# Patient Record
Sex: FEMALE | Race: OTHER | HISPANIC OR LATINO
[De-identification: names, ages, dates, MRNs, and addresses within clinical notes are randomized per-mention and may not be internally consistent; named-entity substitution may affect disease eponyms.]

---

## 2019-08-22 PROBLEM — Z00.00 ENCOUNTER FOR PREVENTIVE HEALTH EXAMINATION: Status: ACTIVE | Noted: 2019-08-22

## 2019-09-03 ENCOUNTER — APPOINTMENT (OUTPATIENT)
Dept: INTERNAL MEDICINE | Facility: CLINIC | Age: 59
End: 2019-09-03
Payer: MEDICARE

## 2019-09-03 VITALS
HEIGHT: 60 IN | OXYGEN SATURATION: 96 % | WEIGHT: 151 LBS | TEMPERATURE: 98.1 F | RESPIRATION RATE: 16 BRPM | DIASTOLIC BLOOD PRESSURE: 76 MMHG | HEART RATE: 103 BPM | SYSTOLIC BLOOD PRESSURE: 119 MMHG | BODY MASS INDEX: 29.64 KG/M2

## 2019-09-03 DIAGNOSIS — K29.40 CHRONIC ATROPHIC GASTRITIS W/OUT BLEEDING: ICD-10-CM

## 2019-09-03 DIAGNOSIS — M21.619 BUNION OF UNSPECIFIED FOOT: ICD-10-CM

## 2019-09-03 DIAGNOSIS — M19.90 UNSPECIFIED OSTEOARTHRITIS, UNSPECIFIED SITE: ICD-10-CM

## 2019-09-03 DIAGNOSIS — R10.13 EPIGASTRIC PAIN: ICD-10-CM

## 2019-09-03 DIAGNOSIS — F43.10 POST-TRAUMATIC STRESS DISORDER, UNSPECIFIED: ICD-10-CM

## 2019-09-03 DIAGNOSIS — F32.9 MAJOR DEPRESSIVE DISORDER, SINGLE EPISODE, UNSPECIFIED: ICD-10-CM

## 2019-09-03 DIAGNOSIS — F17.200 NICOTINE DEPENDENCE, UNSPECIFIED, UNCOMPLICATED: ICD-10-CM

## 2019-09-03 DIAGNOSIS — F10.20 ALCOHOL DEPENDENCE, UNCOMPLICATED: ICD-10-CM

## 2019-09-03 DIAGNOSIS — K59.09 OTHER CONSTIPATION: ICD-10-CM

## 2019-09-03 DIAGNOSIS — M79.601 PAIN IN RIGHT ARM: ICD-10-CM

## 2019-09-03 DIAGNOSIS — Z87.19 PERSONAL HISTORY OF OTHER DISEASES OF THE DIGESTIVE SYSTEM: ICD-10-CM

## 2019-09-03 DIAGNOSIS — Z72.89 OTHER PROBLEMS RELATED TO LIFESTYLE: ICD-10-CM

## 2019-09-03 PROCEDURE — G0439: CPT

## 2019-09-03 PROCEDURE — 99406 BEHAV CHNG SMOKING 3-10 MIN: CPT

## 2019-09-03 RX ORDER — CLONAZEPAM 0.5 MG/1
0.5 TABLET ORAL
Refills: 0 | Status: ACTIVE | COMMUNITY

## 2019-09-03 RX ORDER — NAPROXEN 500 MG/1
500 TABLET ORAL
Qty: 30 | Refills: 0 | Status: ACTIVE | COMMUNITY
Start: 2019-09-03 | End: 1900-01-01

## 2019-09-03 NOTE — PHYSICAL EXAM
[No Acute Distress] : no acute distress [Well Nourished] : well nourished [Well Developed] : well developed [Normal Sclera/Conjunctiva] : normal sclera/conjunctiva [Well-Appearing] : well-appearing [PERRL] : pupils equal round and reactive to light [EOMI] : extraocular movements intact [Normal Outer Ear/Nose] : the outer ears and nose were normal in appearance [Normal Oropharynx] : the oropharynx was normal [No JVD] : no jugular venous distention [No Lymphadenopathy] : no lymphadenopathy [Supple] : supple [Thyroid Normal, No Nodules] : the thyroid was normal and there were no nodules present [No Respiratory Distress] : no respiratory distress  [No Accessory Muscle Use] : no accessory muscle use [Clear to Auscultation] : lungs were clear to auscultation bilaterally [Normal Rate] : normal rate  [Regular Rhythm] : with a regular rhythm [Normal S1, S2] : normal S1 and S2 [No Murmur] : no murmur heard [No Carotid Bruits] : no carotid bruits [No Abdominal Bruit] : a ~M bruit was not heard ~T in the abdomen [No Varicosities] : no varicosities [Pedal Pulses Present] : the pedal pulses are present [No Edema] : there was no peripheral edema [No Palpable Aorta] : no palpable aorta [No Extremity Clubbing/Cyanosis] : no extremity clubbing/cyanosis [Soft] : abdomen soft [Non Tender] : non-tender [Non-distended] : non-distended [No Masses] : no abdominal mass palpated [No HSM] : no HSM [Normal Bowel Sounds] : normal bowel sounds [Normal Anterior Cervical Nodes] : no anterior cervical lymphadenopathy [Normal Posterior Cervical Nodes] : no posterior cervical lymphadenopathy [No Spinal Tenderness] : no spinal tenderness [No CVA Tenderness] : no CVA  tenderness [No Joint Swelling] : no joint swelling [No Rash] : no rash [Grossly Normal Strength/Tone] : grossly normal strength/tone [Coordination Grossly Intact] : coordination grossly intact [No Focal Deficits] : no focal deficits [Normal Gait] : normal gait [Deep Tendon Reflexes (DTR)] : deep tendon reflexes were 2+ and symmetric [Normal Insight/Judgement] : insight and judgment were intact [Normal Affect] : the affect was normal

## 2019-09-03 NOTE — COUNSELING
[Risk of tobacco use and health benefits of smoking cessation discussed] : Risk of tobacco use and health benefits of smoking cessation discussed [Cessation strategies including cessation program discussed] : Cessation strategies including cessation program discussed [Willing to Quit Smoking] : Willing to quit smoking [Use of nicotine replacement therapies and other medications discussed] : Use of nicotine replacement therapies and other medications discussed [Tobacco Use Cessation Intermediate Greater Than 3 Minutes Up to 10 Minutes] : Tobacco Use Cessation Intermediate Greater Than 3 Minutes Up to 10 Minutes

## 2019-09-03 NOTE — HISTORY OF PRESENT ILLNESS
[FreeTextEntry1] : pain right arm\par depression/ anxiety \par Interview and discussion conducted in Luxembourgish by Luxembourgish speaking Physician.\par  [de-identified] : 58 years old female here for first time for annual health examination; she complains of chronic anxiety depression; states pain right arm, right hand for two weeks , taking Advil with no relief

## 2019-09-03 NOTE — HEALTH RISK ASSESSMENT
[Good] : ~his/her~  mood as  good [] : Yes [11-15] : 11-15 [Monthly or less (1 pt)] : Monthly or less (1 point) [1 or 2 (0 pts)] : 1 or 2 (0 points) [Never (0 pts)] : Never (0 points) [No] : In the past 12 months have you used drugs other than those required for medical reasons? No [No falls in past year] : Patient reported no falls in the past year [1] : 1) Little interest or pleasure doing things for several days (1) [2] : 2) Feeling down, depressed, or hopeless for more than half of the days (2) [None] : None [High School] : high school [Single] : single [Feels Safe at Home] : Feels safe at home [Fully functional (bathing, dressing, toileting, transferring, walking, feeding)] : Fully functional (bathing, dressing, toileting, transferring, walking, feeding) [Fully functional (using the telephone, shopping, preparing meals, housekeeping, doing laundry, using] : Fully functional and needs no help or supervision to perform IADLs (using the telephone, shopping, preparing meals, housekeeping, doing laundry, using transportation, managing medications and managing finances) [Smoke Detector] : smoke detector [Carbon Monoxide Detector] : carbon monoxide detector [Safety elements used in home] : safety elements used in home [Seat Belt] :  uses seat belt [Sunscreen] : uses sunscreen [Reviewed updated] : Reviewed updated [Alone] : lives alone [On disability] : on disability [Change in mental status noted] : No change in mental status noted [Language] : denies difficulty with language [Behavior] : denies difficulty with behavior [Handling Complex Tasks] : denies difficulty handling complex tasks [Learning/Retaining New Information] : denies difficulty learning/retaining new information [Spatial Ability and Orientation] : denies difficulty with spatial ability and orientation [Reasoning] : denies difficulty with reasoning [Sexually Active] : not sexually active [High Risk Behavior] : no high risk behavior [Reports changes in hearing] : Reports no changes in hearing [Reports changes in vision] : Reports no changes in vision [Reports changes in dental health] : Reports no changes in dental health [Reports normal functional visual acuity (ie: able to read med bottle)] : Reports poor functional visual acuity.  [Caregiver Concerns] : does not have caregiver concerns [TB Exposure] : is not being exposed to tuberculosis [Guns at Home] : no guns at home [AdvancecareDate] : 09/03/2019

## 2019-09-11 ENCOUNTER — APPOINTMENT (OUTPATIENT)
Dept: INTERNAL MEDICINE | Facility: CLINIC | Age: 59
End: 2019-09-11

## 2021-10-01 VITALS
DIASTOLIC BLOOD PRESSURE: 69 MMHG | HEART RATE: 115 BPM | OXYGEN SATURATION: 95 % | TEMPERATURE: 98 F | SYSTOLIC BLOOD PRESSURE: 115 MMHG | RESPIRATION RATE: 18 BRPM

## 2021-10-01 LAB
ALBUMIN SERPL ELPH-MCNC: 3.4 G/DL — SIGNIFICANT CHANGE UP (ref 3.3–5)
ALP SERPL-CCNC: 92 U/L — SIGNIFICANT CHANGE UP (ref 40–120)
ALT FLD-CCNC: 65 U/L — HIGH (ref 4–33)
AMPHET UR-MCNC: NEGATIVE — SIGNIFICANT CHANGE UP
ANION GAP SERPL CALC-SCNC: 22 MMOL/L — HIGH (ref 7–14)
APAP SERPL-MCNC: <15 UG/ML — SIGNIFICANT CHANGE UP (ref 15–25)
AST SERPL-CCNC: 103 U/L — HIGH (ref 4–32)
BARBITURATES UR SCN-MCNC: NEGATIVE — SIGNIFICANT CHANGE UP
BASOPHILS # BLD AUTO: 0.09 K/UL — SIGNIFICANT CHANGE UP (ref 0–0.2)
BASOPHILS NFR BLD AUTO: 0.6 % — SIGNIFICANT CHANGE UP (ref 0–2)
BENZODIAZ UR-MCNC: NEGATIVE — SIGNIFICANT CHANGE UP
BILIRUB SERPL-MCNC: 0.8 MG/DL — SIGNIFICANT CHANGE UP (ref 0.2–1.2)
BUN SERPL-MCNC: 9 MG/DL — SIGNIFICANT CHANGE UP (ref 7–23)
CALCIUM SERPL-MCNC: 7.5 MG/DL — LOW (ref 8.4–10.5)
CHLORIDE SERPL-SCNC: 95 MMOL/L — LOW (ref 98–107)
CO2 SERPL-SCNC: 22 MMOL/L — SIGNIFICANT CHANGE UP (ref 22–31)
COCAINE METAB.OTHER UR-MCNC: NEGATIVE — SIGNIFICANT CHANGE UP
CREAT SERPL-MCNC: 0.51 MG/DL — SIGNIFICANT CHANGE UP (ref 0.5–1.3)
CREATININE URINE RESULT, DAU: 122 MG/DL — SIGNIFICANT CHANGE UP
EOSINOPHIL # BLD AUTO: 0.01 K/UL — SIGNIFICANT CHANGE UP (ref 0–0.5)
EOSINOPHIL NFR BLD AUTO: 0.1 % — SIGNIFICANT CHANGE UP (ref 0–6)
ETHANOL SERPL-MCNC: 290 MG/DL — HIGH
GLUCOSE SERPL-MCNC: 73 MG/DL — SIGNIFICANT CHANGE UP (ref 70–99)
HCT VFR BLD CALC: 44.2 % — SIGNIFICANT CHANGE UP (ref 34.5–45)
HGB BLD-MCNC: 16.2 G/DL — HIGH (ref 11.5–15.5)
IANC: 11.24 K/UL — HIGH (ref 1.5–8.5)
IMM GRANULOCYTES NFR BLD AUTO: 1.1 % — SIGNIFICANT CHANGE UP (ref 0–1.5)
LIDOCAIN IGE QN: 53 U/L — SIGNIFICANT CHANGE UP (ref 7–60)
LYMPHOCYTES # BLD AUTO: 1.3 K/UL — SIGNIFICANT CHANGE UP (ref 1–3.3)
LYMPHOCYTES # BLD AUTO: 9.2 % — LOW (ref 13–44)
MAGNESIUM SERPL-MCNC: 2.1 MG/DL — SIGNIFICANT CHANGE UP (ref 1.6–2.6)
MCHC RBC-ENTMCNC: 33.6 PG — SIGNIFICANT CHANGE UP (ref 27–34)
MCHC RBC-ENTMCNC: 36.7 GM/DL — HIGH (ref 32–36)
MCV RBC AUTO: 91.7 FL — SIGNIFICANT CHANGE UP (ref 80–100)
METHADONE UR-MCNC: NEGATIVE — SIGNIFICANT CHANGE UP
MONOCYTES # BLD AUTO: 1.28 K/UL — HIGH (ref 0–0.9)
MONOCYTES NFR BLD AUTO: 9.1 % — SIGNIFICANT CHANGE UP (ref 2–14)
NEUTROPHILS # BLD AUTO: 11.24 K/UL — HIGH (ref 1.8–7.4)
NEUTROPHILS NFR BLD AUTO: 79.9 % — HIGH (ref 43–77)
NRBC # BLD: 0 /100 WBCS — SIGNIFICANT CHANGE UP
NRBC # FLD: 0.03 K/UL — HIGH
OPIATES UR-MCNC: NEGATIVE — SIGNIFICANT CHANGE UP
OXYCODONE UR-MCNC: NEGATIVE — SIGNIFICANT CHANGE UP
PCP SPEC-MCNC: SIGNIFICANT CHANGE UP
PCP UR-MCNC: NEGATIVE — SIGNIFICANT CHANGE UP
PHOSPHATE SERPL-MCNC: 4.1 MG/DL — SIGNIFICANT CHANGE UP (ref 2.5–4.5)
PLATELET # BLD AUTO: 321 K/UL — SIGNIFICANT CHANGE UP (ref 150–400)
POTASSIUM SERPL-MCNC: 3.6 MMOL/L — SIGNIFICANT CHANGE UP (ref 3.5–5.3)
POTASSIUM SERPL-SCNC: 3.6 MMOL/L — SIGNIFICANT CHANGE UP (ref 3.5–5.3)
PROT SERPL-MCNC: 5.6 G/DL — LOW (ref 6–8.3)
RBC # BLD: 4.82 M/UL — SIGNIFICANT CHANGE UP (ref 3.8–5.2)
RBC # FLD: 20.8 % — HIGH (ref 10.3–14.5)
SALICYLATES SERPL-MCNC: <5 MG/DL — LOW (ref 15–30)
SODIUM SERPL-SCNC: 139 MMOL/L — SIGNIFICANT CHANGE UP (ref 135–145)
THC UR QL: NEGATIVE — SIGNIFICANT CHANGE UP
TOXICOLOGY SCREEN, DRUGS OF ABUSE, SERUM RESULT: SIGNIFICANT CHANGE UP
WBC # BLD: 14.07 K/UL — HIGH (ref 3.8–10.5)
WBC # FLD AUTO: 14.07 K/UL — HIGH (ref 3.8–10.5)

## 2021-10-01 PROCEDURE — 99285 EMERGENCY DEPT VISIT HI MDM: CPT

## 2021-10-01 RX ORDER — ONDANSETRON 8 MG/1
4 TABLET, FILM COATED ORAL ONCE
Refills: 0 | Status: COMPLETED | OUTPATIENT
Start: 2021-10-01 | End: 2021-10-01

## 2021-10-01 RX ORDER — SODIUM CHLORIDE 9 MG/ML
1000 INJECTION, SOLUTION INTRAVENOUS ONCE
Refills: 0 | Status: COMPLETED | OUTPATIENT
Start: 2021-10-01 | End: 2021-10-01

## 2021-10-01 RX ADMIN — SODIUM CHLORIDE 500 MILLILITER(S): 9 INJECTION, SOLUTION INTRAVENOUS at 21:12

## 2021-10-01 RX ADMIN — ONDANSETRON 4 MILLIGRAM(S): 8 TABLET, FILM COATED ORAL at 21:12

## 2021-10-01 NOTE — ED PROVIDER NOTE - OBJECTIVE STATEMENT
Ms Juliette MCRAE is a 60-year old with a history of EtOH abuse, depression, anxiety who presents with alcohol intake. She is accompanied by her sister, who provides history. Professional  utilized.     Ms MCRAE has been drinking for the past 45 years, notably increasing in the past 6 weeks. She states drinking several regular-sized bottles of vodka each day. Last drink 4pm today. She also endorses some nausea, nonbloody vomiting. She has had shaking and diaphoresis in the past, though does not endorse any today. Her sister also endorses concern that she typically takes all of her medication (advil, lorazepam, tylenol) at once with alcohol. She has a history of depression and anxiety. She denies any active suicidal ideation, homicidal ideation, or hallucinations. She does endorse some audiovisual hallucination in the past, with bright lights and people crowded around her and speaking in voices that she does not understand.     100PY smoker. Denies recreational drug use. Ms Juliette MCRAE is a 60-year old with a history of EtOH abuse, depression, anxiety who presents with alcohol intake. She is accompanied by her sister, who provides history. Professional  utilized.     Ms MCRAE has been drinking for the past 45 years, notably increasing in the past 6 weeks. She states drinking several regular-sized bottles of vodka each day. Last drink 4pm today. She also endorses some nausea, nonbloody vomiting. She has had shaking and diaphoresis in the past, though does not endorse any today. Her sister also endorses concern that she typically takes all of her medication (advil, tylenol, aspirin, clonazepam) at once with alcohol, with the intention to induce sleep. She has a history of depression and anxiety. 100PY smoker. Denies recreational drug use. She denies any active suicidal ideation, homicidal ideation, or hallucinations. She does endorse some audiovisual hallucination in the past, with bright lights and people crowded around her and speaking in voices that she does not understand.

## 2021-10-01 NOTE — ED PROVIDER NOTE - PHYSICAL EXAMINATION
Vital Signs Last 24 Hrs  T(C): 36.4 (01 Oct 2021 19:08), Max: 36.4 (01 Oct 2021 19:08)  T(F): 97.6 (01 Oct 2021 19:08), Max: 97.6 (01 Oct 2021 19:08)  HR: 115 (01 Oct 2021 19:08) (115 - 115)  BP: 115/69 (01 Oct 2021 19:08) (115/69 - 115/69)  BP(mean): --  RR: 18 (01 Oct 2021 19:08) (18 - 18)  SpO2: 95% (01 Oct 2021 19:08) (95% - 95%)    CONSTITUTIONAL: Well-groomed, resting comfortably on stretcher in no apparent distress  EYES: No conjunctival or scleral injection, non-icteric;   ENMT: No external nasal lesions; mucous membranes dry   NECK: Trachea midline without visible neck mass   RESPIRATORY: Breathing comfortably; lungs CTA without wheeze/rhonchi/rales  CARDIOVASCULAR: mildly tachycardic, regular rhythm, no M/G/R; pedal pulses full and symmetric; no lower extremity edema  GASTROINTESTINAL: No palpable masses or tenderness, +BS throughout, no rebound/guarding  SKIN: Dry, no diaphoresis, no rashes or ulcers noted  NEUROLOGIC: lethargic and sleepy in stretcher, but arousable by voice; no tremulousness, no seizures; no tongue fasciculations; no nystagmus; moves all extremities, strength and sensation grossly intact,   PSYCHIATRIC: A+Ox3; depressed mood and flat affect

## 2021-10-01 NOTE — ED ADULT TRIAGE NOTE - CHIEF COMPLAINT QUOTE
Pt brought in by her sister for ETOH abuse--pt drinks a quart of vodka per days and takes 10 pills at the same time--tylenol/aleve/asa/clonazpam  every day mixed together. Pt tried to slit her throat and sister prevented it. Pt intoxicated  --falling asleep in chair

## 2021-10-01 NOTE — ED ADULT NURSE NOTE - OBJECTIVE STATEMENT
Pt received to room 14 a/o x 3 primarily [place] speaking with  used. pt c/o ETOH use, nausea. per pt she has been drinking a "small bottle of vodka" per day for the past month. per pt sister pt also has been taking multiple pills with the alcohol everyday. pt denies taking any pills. Pt states she wants something so I can sleep. Respirations even and unlabored. Lung sounds clear with equal chest rise bilaterally. ABD is soft, non tender, non distended with normal active bowel sounds  Pt denies SI/HI.  Pt denies visual or auditory hallucinations or other complaints. 20g iv placed to right AC. labs drawn and sent. medication given as per order. pt sister took all belongings home.

## 2021-10-01 NOTE — ED PROVIDER NOTE - PROGRESS NOTE DETAILS
MD ENRIQUE PGY1: reassessed patient. mild tongue fasciculations. Ativan 2mg IV push given. Will reassess.

## 2021-10-01 NOTE — ED PROVIDER NOTE - NS ED ROS FT
REVIEW OF SYSTEMS:  CONSTITUTIONAL: +fatigue, No fever, chills, weight loss  EYES: No eye pain, visual disturbances, hallucinations   NECK: No pain or stiffness  RESPIRATORY: No cough, wheezing, chills or hemoptysis; No shortness of breath  CARDIOVASCULAR: No chest pain, palpitations, dizziness, or leg swelling  GASTROINTESTINAL: +nausea, +vomiting. No abdominal or epigastric pain. No hematemesis; No diarrhea or constipation.   GENITOURINARY: No dysuria, frequency, hematuria  NEUROLOGICAL: No headaches, memory loss, loss of strength, numbness, or tremors  SKIN: No rashes or diaphoresis

## 2021-10-01 NOTE — ED PROVIDER NOTE - CLINICAL SUMMARY MEDICAL DECISION MAKING FREE TEXT BOX
60F with PMHx EtOH abuse, depression, anxiety here for EtOH abuse. Currently no dysautonomia, tremors, or other signs/symptoms concerning for withdrawal. Plan for CBC, CMP, COVId for admission, EtOH level, drug/tox screen, 1L LR, Zofran for nauea, 1:1 and CIWA for safety.

## 2021-10-01 NOTE — ED PROVIDER NOTE - ATTENDING CONTRIBUTION TO CARE
Ras Larios DO:  patient seen and evaluated with the resident.  I was present for key portions of the History & Physical, and I agree with the Impression & Plan. 61 yo f pmh etoh abuse, pw etoh abuse. bib friend who provides collateral bedside. states pt has been using etoh daily for 6 weeks, increasing amounts, possible L vodka daily, last drink 1600. also reports taking prescription anti anxiety meds, possibly benzos. has been attempting to get friend into detox, accepted today however upon arriving center was not taking new pts today. brought here instead. reported active si gestures at home. pt denying si/hi in ed. mild n/v. denies cp, ha, falls. arrives hds, low grade tach. no tongue fasciculations.  no active hallucinations. labs, constants obs, likely tba.

## 2021-10-02 ENCOUNTER — INPATIENT (INPATIENT)
Facility: HOSPITAL | Age: 61
LOS: 9 days | Discharge: HOME | End: 2021-10-12
Attending: HOSPITALIST | Admitting: HOSPITALIST
Payer: MEDICARE

## 2021-10-02 DIAGNOSIS — F10.929 ALCOHOL USE, UNSPECIFIED WITH INTOXICATION, UNSPECIFIED: ICD-10-CM

## 2021-10-02 DIAGNOSIS — Z29.9 ENCOUNTER FOR PROPHYLACTIC MEASURES, UNSPECIFIED: ICD-10-CM

## 2021-10-02 DIAGNOSIS — F10.10 ALCOHOL ABUSE, UNCOMPLICATED: ICD-10-CM

## 2021-10-02 DIAGNOSIS — F41.9 ANXIETY DISORDER, UNSPECIFIED: ICD-10-CM

## 2021-10-02 DIAGNOSIS — F19.94 OTHER PSYCHOACTIVE SUBSTANCE USE, UNSPECIFIED WITH PSYCHOACTIVE SUBSTANCE-INDUCED MOOD DISORDER: ICD-10-CM

## 2021-10-02 DIAGNOSIS — R74.01 ELEVATION OF LEVELS OF LIVER TRANSAMINASE LEVELS: ICD-10-CM

## 2021-10-02 DIAGNOSIS — D72.829 ELEVATED WHITE BLOOD CELL COUNT, UNSPECIFIED: ICD-10-CM

## 2021-10-02 DIAGNOSIS — F29 UNSPECIFIED PSYCHOSIS NOT DUE TO A SUBSTANCE OR KNOWN PHYSIOLOGICAL CONDITION: ICD-10-CM

## 2021-10-02 LAB — SARS-COV-2 RNA SPEC QL NAA+PROBE: SIGNIFICANT CHANGE UP

## 2021-10-02 PROCEDURE — 99223 1ST HOSP IP/OBS HIGH 75: CPT

## 2021-10-02 PROCEDURE — 76700 US EXAM ABDOM COMPLETE: CPT | Mod: 26

## 2021-10-02 PROCEDURE — 12345: CPT | Mod: NC

## 2021-10-02 RX ORDER — NICOTINE POLACRILEX 2 MG
1 GUM BUCCAL DAILY
Refills: 0 | Status: DISCONTINUED | OUTPATIENT
Start: 2021-10-02 | End: 2021-10-12

## 2021-10-02 RX ORDER — ACETAMINOPHEN 500 MG
650 TABLET ORAL EVERY 6 HOURS
Refills: 0 | Status: DISCONTINUED | OUTPATIENT
Start: 2021-10-02 | End: 2021-10-12

## 2021-10-02 RX ORDER — SODIUM CHLORIDE 9 MG/ML
1000 INJECTION INTRAMUSCULAR; INTRAVENOUS; SUBCUTANEOUS
Refills: 0 | Status: DISCONTINUED | OUTPATIENT
Start: 2021-10-02 | End: 2021-10-03

## 2021-10-02 RX ORDER — INFLUENZA VIRUS VACCINE 15; 15; 15; 15 UG/.5ML; UG/.5ML; UG/.5ML; UG/.5ML
0.5 SUSPENSION INTRAMUSCULAR ONCE
Refills: 0 | Status: DISCONTINUED | OUTPATIENT
Start: 2021-10-02 | End: 2021-10-12

## 2021-10-02 RX ORDER — LANOLIN ALCOHOL/MO/W.PET/CERES
3 CREAM (GRAM) TOPICAL AT BEDTIME
Refills: 0 | Status: DISCONTINUED | OUTPATIENT
Start: 2021-10-02 | End: 2021-10-12

## 2021-10-02 RX ORDER — ONDANSETRON 8 MG/1
4 TABLET, FILM COATED ORAL EVERY 8 HOURS
Refills: 0 | Status: DISCONTINUED | OUTPATIENT
Start: 2021-10-02 | End: 2021-10-11

## 2021-10-02 RX ORDER — ENOXAPARIN SODIUM 100 MG/ML
40 INJECTION SUBCUTANEOUS DAILY
Refills: 0 | Status: DISCONTINUED | OUTPATIENT
Start: 2021-10-02 | End: 2021-10-12

## 2021-10-02 RX ADMIN — ENOXAPARIN SODIUM 40 MILLIGRAM(S): 100 INJECTION SUBCUTANEOUS at 13:25

## 2021-10-02 RX ADMIN — Medication 2 MILLIGRAM(S): at 21:22

## 2021-10-02 RX ADMIN — Medication 2 MILLIGRAM(S): at 01:32

## 2021-10-02 RX ADMIN — Medication 1 PATCH: at 20:07

## 2021-10-02 RX ADMIN — Medication 2 MILLIGRAM(S): at 17:59

## 2021-10-02 RX ADMIN — Medication 2 MILLIGRAM(S): at 14:07

## 2021-10-02 RX ADMIN — SODIUM CHLORIDE 75 MILLILITER(S): 9 INJECTION INTRAMUSCULAR; INTRAVENOUS; SUBCUTANEOUS at 04:37

## 2021-10-02 NOTE — BH CONSULTATION LIAISON ASSESSMENT NOTE - NSBHCHARTREVIEWLAB_PSY_A_CORE FT
CBC Full  -  ( 01 Oct 2021 21:14 )  WBC Count : 14.07 K/uL  RBC Count : 4.82 M/uL  Hemoglobin : 16.2 g/dL  Hematocrit : 44.2 %  Platelet Count - Automated : 321 K/uL  Mean Cell Volume : 91.7 fL  Mean Cell Hemoglobin : 33.6 pg  Mean Cell Hemoglobin Concentration : 36.7 gm/dL  Auto Neutrophil # : 11.24 K/uL  Auto Lymphocyte # : 1.30 K/uL  Auto Monocyte # : 1.28 K/uL  Auto Eosinophil # : 0.01 K/uL  Auto Basophil # : 0.09 K/uL  Auto Neutrophil % : 79.9 %  Auto Lymphocyte % : 9.2 %  Auto Monocyte % : 9.1 %  Auto Eosinophil % : 0.1 %  Auto Basophil % : 0.6 %  10-01    139  |  95<L>  |  9   ----------------------------<  73  3.6   |  22  |  0.51    Ca    7.5<L>      01 Oct 2021 21:14  Phos  4.1     10-01  Mg     2.10     10-01    TPro  5.6<L>  /  Alb  3.4  /  TBili  0.8  /  DBili  x   /  AST  103<H>  /  ALT  65<H>  /  AlkPhos  92  10-01  Alcohol, Blood: 290: <10 mg/dL = Negative mg/dL (10.01.21 @ 21:14)

## 2021-10-02 NOTE — H&P ADULT - NSHPPHYSICALEXAM_GEN_ALL_CORE
PHYSICAL EXAM:  GENERAL: NAD, well-developed, well-nourished  HEAD:  Atraumatic, Normocephalic  EYES: EOMI, PERRL, conjunctiva and sclera clear  NECK: Supple, No JVD  CHEST/LUNG: Clear to auscultation bilaterally; No wheezes, rales or rhonchi  HEART: tachycardic; No murmurs, rubs, or gallops, (+)S1, S2  ABDOMEN: Soft, Nontender, Nondistended; Normal Bowel sounds   EXTREMITIES:  2+ Peripheral Pulses, No clubbing, cyanosis, or edema  PSYCH: unable to assess due to patient cooperation   NEUROLOGY: unable to assess due to patient cooperation   SKIN: No rashes or lesions

## 2021-10-02 NOTE — H&P ADULT - NSHPLABSRESULTS_GEN_ALL_CORE
16.2   14.07 )-----------( 321      ( 01 Oct 2021 21:14 )             44.2       10-01    139  |  95<L>  |  9   ----------------------------<  73  3.6   |  22  |  0.51    Ca    7.5<L>      01 Oct 2021 21:14  Phos  4.1     10-01  Mg     2.10     10-01    TPro  5.6<L>  /  Alb  3.4  /  TBili  0.8  /  DBili  x   /  AST  103<H>  /  ALT  65<H>  /  AlkPhos  92  10-01      EKG interpreted by myself: sinus tachycardia

## 2021-10-02 NOTE — PROGRESS NOTE ADULT - PROBLEM SELECTOR PLAN 5
Chronic unstable  Called pharmacy to confirm: klonopin 0.5 mg TID prescribed by Dr. Kaur Pardo   Fills meds [facility] @ [phone]

## 2021-10-02 NOTE — H&P ADULT - PROBLEM SELECTOR PLAN 2
New  Patient endorsed "some audiovisual hallucination in the past, with bright lights and people crowded around her and speaking in voices that she does not understand."  1:1  Psych consult once not intoxicated/withdrawing

## 2021-10-02 NOTE — BH CONSULTATION LIAISON ASSESSMENT NOTE - RISK ASSESSMENT
Risk Factors: acute medical condition, etoh dependence/abuse, hx anxiety/depression, vh r/t alcohol, si r/t alcohol  Protective Factors: residential stability, supportive family, positive therapeutic relationship, denies si/sa, no access to firearms

## 2021-10-02 NOTE — BH CONSULTATION LIAISON ASSESSMENT NOTE - DIFFERENTIAL
etoh abuse   substance induced mood disorder  etoh dependence   substance induced mood disorder  etoh dependence/abuse

## 2021-10-02 NOTE — PROGRESS NOTE ADULT - PROBLEM SELECTOR PLAN 3
WBC 14.04 with history if vomiting, afebrile  Monitor off abx for now   If spikes temp would cover with Zosyn

## 2021-10-02 NOTE — PROGRESS NOTE ADULT - SUBJECTIVE AND OBJECTIVE BOX
[facility] Division of Hospital Medicine  Claudia Mariee DO  Pager (SANDEE-F, 8N-5P): [phone]    Patient is a 60y old  Female who presents with a chief complaint of intoxication (02 Oct 2021 02:57)    SUBJECTIVE / OVERNIGHT EVENTS: Pt awake and intermittently engaging in conversation this AM.  Reports tremors developing, has hx of ETOH withdrawal, but appears not in our system.   No fevers/chills, no N/V/D.  Asking for medicaiton to help her sleep.     MEDICATIONS  (STANDING):  enoxaparin Injectable 40 milliGRAM(s) SubCutaneous daily  influenza   Vaccine 0.5 milliLiter(s) IntraMuscular once  LORazepam   Injectable 2 milliGRAM(s) IV Push every 4 hours  LORazepam   Injectable   IV Push   sodium chloride 0.9%. 1000 milliLiter(s) (75 mL/Hr) IV Continuous <Continuous>    MEDICATIONS  (PRN):  acetaminophen   Tablet .. 650 milliGRAM(s) Oral every 6 hours PRN Temp greater or equal to 38C (100.4F), Mild Pain (1 - 3)  aluminum hydroxide/magnesium hydroxide/simethicone Suspension 30 milliLiter(s) Oral every 4 hours PRN Dyspepsia  LORazepam     Tablet 2 milliGRAM(s) Oral every 2 hours PRN Symptom-triggered 2 point increase in CIWA-Ar  LORazepam   Injectable 2 milliGRAM(s) IV Push every 1 hour PRN Symptom-triggered: each CIWA -Ar score 8 or GREATER  melatonin 3 milliGRAM(s) Oral at bedtime PRN Insomnia  ondansetron Injectable 4 milliGRAM(s) IV Push every 8 hours PRN Nausea and/or Vomiting      PHYSICAL EXAM:  Vital Signs Last 24 Hrs  T(C): 37.3 (02 Oct 2021 12:00), Max: 37.3 (02 Oct 2021 12:00)  T(F): 99.2 (02 Oct 2021 12:00), Max: 99.2 (02 Oct 2021 12:00)  HR: 99 (02 Oct 2021 12:00) (90 - 120)  BP: 134/57 (02 Oct 2021 11:48) (99/70 - 134/57)  BP(mean): --  RR: 18 (02 Oct 2021 06:05) (17 - 18)  SpO2: 92% (02 Oct 2021 06:05) (90% - 95%)    CONSTITUTIONAL: NAD, well-developed, well-groomed, tremulous  EYES: PERRLA; conjunctiva and sclera clear  RESPIRATORY: Normal respiratory effort; lungs are clear to auscultation bilaterally  CARDIOVASCULAR: Regular rate and rhythm, normal S1 and S2, no murmur/rub/gallop; No lower extremity edema  ABDOMEN: Nontender to palpation, normoactive bowel sounds, no rebound/guarding  MUSCLOSKELETAL:  No clubbing or cyanosis of digits; no joint swelling or tenderness to palpation  PSYCH: A+O to person, place, not to time; anxious, tremulous with arms extended  NEUROLOGY: CN 2-12 are intact and symmetric; no gross sensory deficits; no motor deficits   SKIN: No rashes; no palpable lesions    LABS:                        16.2   14.07 )-----------( 321      ( 01 Oct 2021 21:14 )             44.2     10-01    139  |  95<L>  |  9   ----------------------------<  73  3.6   |  22  |  0.51    Ca    7.5<L>      01 Oct 2021 21:14  Phos  4.1     10-01  Mg     2.10     10-01    TPro  5.6<L>  /  Alb  3.4  /  TBili  0.8  /  DBili  x   /  AST  103<H>  /  ALT  65<H>  /  AlkPhos  92  10-01

## 2021-10-02 NOTE — BH CONSULTATION LIAISON ASSESSMENT NOTE - CASE SUMMARY
Met with the patient via tele platform, impression and plan discussed with acp Keara Baldwin NP and agreed upon. Patient denies any si when asked, but there is significant concerns raised by sister about patient's ongoing depression and SI even when sober. Sister is very concerned about the patient's safety at this time, and is advocating for an inpatient psychiatric admission.   Continue 1:1co for now. Continue w/d treatment. Will see on Monday for f/u and risk assessment. CANNOT LEAVE AMA

## 2021-10-02 NOTE — BH CONSULTATION LIAISON ASSESSMENT NOTE - CURRENT MEDICATION
MEDICATIONS  (STANDING):  enoxaparin Injectable 40 milliGRAM(s) SubCutaneous daily  influenza   Vaccine 0.5 milliLiter(s) IntraMuscular once  LORazepam   Injectable 2 milliGRAM(s) IV Push every 4 hours  LORazepam   Injectable   IV Push   sodium chloride 0.9%. 1000 milliLiter(s) (75 mL/Hr) IV Continuous <Continuous>    MEDICATIONS  (PRN):  acetaminophen   Tablet .. 650 milliGRAM(s) Oral every 6 hours PRN Temp greater or equal to 38C (100.4F), Mild Pain (1 - 3)  aluminum hydroxide/magnesium hydroxide/simethicone Suspension 30 milliLiter(s) Oral every 4 hours PRN Dyspepsia  LORazepam     Tablet 2 milliGRAM(s) Oral every 2 hours PRN Symptom-triggered 2 point increase in CIWA-Ar  LORazepam   Injectable 2 milliGRAM(s) IV Push every 1 hour PRN Symptom-triggered: each CIWA -Ar score 8 or GREATER  melatonin 3 milliGRAM(s) Oral at bedtime PRN Insomnia  ondansetron Injectable 4 milliGRAM(s) IV Push every 8 hours PRN Nausea and/or Vomiting
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
## 2021-10-02 NOTE — H&P ADULT - HISTORY OF PRESENT ILLNESS
60 yr old female with a pmh of anxiety and alcohol and tobacco dependence . Patient would not awaken as s/p lorazepam for questioning  Therefore per chart review:  History of alcohol abuse with increase in amount of drinking the past 6 weeks. Patient drinks Vodka- of late copious amounts. Last drink 4pm 10/1/21. She had nausea and nonbloody vomiting today.   Patient endorsed "some audiovisual hallucination in the past, with bright lights and people crowded around her and speaking in voices that she does not understand."    Vitals in the ED T97.6, , /69, RR 18 95% RA
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
## 2021-10-02 NOTE — BH CONSULTATION LIAISON ASSESSMENT NOTE - NSBHCHARTREVIEWINVESTIGATE_PSY_A_CORE FT
Ventricular Rate 111 BPM    Atrial Rate 111 BPM    P-R Interval 122 ms    QRS Duration 76 ms    Q-T Interval 326 ms    QTC Calculation(Bazett) 443 ms    P Axis 72 degrees    R Axis 74 degrees    T Axis 58 degrees    Diagnosis Line Sinus tachycardia  Anterior infarct , age undetermined  Abnormal ECG

## 2021-10-02 NOTE — BH CONSULTATION LIAISON ASSESSMENT NOTE - NSBHCHARTREVIEWVS_PSY_A_CORE FT
Vital Signs Last 24 Hrs  T(C): 37.3 (02 Oct 2021 12:00), Max: 37.3 (02 Oct 2021 12:00)  T(F): 99.2 (02 Oct 2021 12:00), Max: 99.2 (02 Oct 2021 12:00)  HR: 89 (02 Oct 2021 13:57) (89 - 120)  BP: 138/67 (02 Oct 2021 13:57) (99/70 - 138/67)  BP(mean): --  RR: 18 (02 Oct 2021 13:57) (17 - 18)  SpO2: 96% (02 Oct 2021 13:57) (90% - 96%)

## 2021-10-02 NOTE — PROGRESS NOTE ADULT - PROBLEM SELECTOR PLAN 2
Patient endorsed "some audiovisual hallucination in the past, with bright lights and people crowded around her and speaking in voices that she does not understand."  Currently not endorsing hallucinations however not engaging much in conversation  Will consult psych for family's concern for suicidal ideations/attempts
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
## 2021-10-02 NOTE — PHARMACOTHERAPY INTERVENTION NOTE - COMMENTS
Medication list updated in Outpatient Medication Record (OMR) per [facility]. Please call [employer] [phone] if you have any questions.

## 2021-10-02 NOTE — BH CONSULTATION LIAISON ASSESSMENT NOTE - VIOLENCE RISK FACTORS:
Substance abuse/Impulsivity Substance abuse/Impulsivity/History of being victimized/traumatized/Lack of insight into violence risk/need for treatment

## 2021-10-03 DIAGNOSIS — J45.909 UNSPECIFIED ASTHMA, UNCOMPLICATED: ICD-10-CM

## 2021-10-03 LAB
B PERT DNA SPEC QL NAA+PROBE: SIGNIFICANT CHANGE UP
B PERT+PARAPERT DNA PNL SPEC NAA+PROBE: SIGNIFICANT CHANGE UP
BORDETELLA PARAPERTUSSIS (RAPRVP): SIGNIFICANT CHANGE UP
C PNEUM DNA SPEC QL NAA+PROBE: SIGNIFICANT CHANGE UP
COVID-19 SPIKE DOMAIN AB INTERP: POSITIVE
COVID-19 SPIKE DOMAIN ANTIBODY RESULT: >250 U/ML — HIGH
FLUAV SUBTYP SPEC NAA+PROBE: SIGNIFICANT CHANGE UP
FLUBV RNA SPEC QL NAA+PROBE: SIGNIFICANT CHANGE UP
HADV DNA SPEC QL NAA+PROBE: SIGNIFICANT CHANGE UP
HCOV 229E RNA SPEC QL NAA+PROBE: SIGNIFICANT CHANGE UP
HCOV HKU1 RNA SPEC QL NAA+PROBE: SIGNIFICANT CHANGE UP
HCOV NL63 RNA SPEC QL NAA+PROBE: SIGNIFICANT CHANGE UP
HCOV OC43 RNA SPEC QL NAA+PROBE: SIGNIFICANT CHANGE UP
HCV AB S/CO SERPL IA: 0.09 S/CO — SIGNIFICANT CHANGE UP (ref 0–0.99)
HCV AB SERPL-IMP: SIGNIFICANT CHANGE UP
HMPV RNA SPEC QL NAA+PROBE: SIGNIFICANT CHANGE UP
HPIV1 RNA SPEC QL NAA+PROBE: SIGNIFICANT CHANGE UP
HPIV2 RNA SPEC QL NAA+PROBE: SIGNIFICANT CHANGE UP
HPIV3 RNA SPEC QL NAA+PROBE: SIGNIFICANT CHANGE UP
HPIV4 RNA SPEC QL NAA+PROBE: SIGNIFICANT CHANGE UP
M PNEUMO DNA SPEC QL NAA+PROBE: SIGNIFICANT CHANGE UP
RAPID RVP RESULT: SIGNIFICANT CHANGE UP
RSV RNA SPEC QL NAA+PROBE: SIGNIFICANT CHANGE UP
RV+EV RNA SPEC QL NAA+PROBE: SIGNIFICANT CHANGE UP
SARS-COV-2 IGG+IGM SERPL QL IA: >250 U/ML — HIGH
SARS-COV-2 IGG+IGM SERPL QL IA: POSITIVE
SARS-COV-2 RNA SPEC QL NAA+PROBE: SIGNIFICANT CHANGE UP

## 2021-10-03 PROCEDURE — 99233 SBSQ HOSP IP/OBS HIGH 50: CPT

## 2021-10-03 RX ORDER — IPRATROPIUM/ALBUTEROL SULFATE 18-103MCG
3 AEROSOL WITH ADAPTER (GRAM) INHALATION EVERY 6 HOURS
Refills: 0 | Status: DISCONTINUED | OUTPATIENT
Start: 2021-10-03 | End: 2021-10-11

## 2021-10-03 RX ORDER — FUROSEMIDE 40 MG
20 TABLET ORAL ONCE
Refills: 0 | Status: COMPLETED | OUTPATIENT
Start: 2021-10-03 | End: 2021-10-03

## 2021-10-03 RX ADMIN — Medication 3 MILLILITER(S): at 09:31

## 2021-10-03 RX ADMIN — Medication 3 MILLILITER(S): at 16:20

## 2021-10-03 RX ADMIN — Medication 100 MILLIGRAM(S): at 14:45

## 2021-10-03 RX ADMIN — Medication 2 MILLIGRAM(S): at 02:18

## 2021-10-03 RX ADMIN — Medication 1 PATCH: at 19:37

## 2021-10-03 RX ADMIN — Medication 100 MILLIGRAM(S): at 09:38

## 2021-10-03 RX ADMIN — Medication 2 MILLIGRAM(S): at 09:02

## 2021-10-03 RX ADMIN — Medication 1.5 MILLIGRAM(S): at 18:19

## 2021-10-03 RX ADMIN — Medication 1.5 MILLIGRAM(S): at 14:46

## 2021-10-03 RX ADMIN — Medication 100 MILLIGRAM(S): at 22:00

## 2021-10-03 RX ADMIN — Medication 1 PATCH: at 14:05

## 2021-10-03 RX ADMIN — ENOXAPARIN SODIUM 40 MILLIGRAM(S): 100 INJECTION SUBCUTANEOUS at 14:05

## 2021-10-03 RX ADMIN — Medication 20 MILLIGRAM(S): at 09:02

## 2021-10-03 RX ADMIN — Medication 650 MILLIGRAM(S): at 21:59

## 2021-10-03 RX ADMIN — Medication 3 MILLILITER(S): at 22:41

## 2021-10-03 RX ADMIN — Medication 1.5 MILLIGRAM(S): at 22:00

## 2021-10-03 RX ADMIN — Medication 1 PATCH: at 20:07

## 2021-10-03 RX ADMIN — Medication 2 MILLIGRAM(S): at 05:51

## 2021-10-03 RX ADMIN — Medication 1 PATCH: at 07:13

## 2021-10-03 NOTE — PROGRESS NOTE ADULT - PROBLEM SELECTOR PLAN 1
ETOH level high on admission  Now with exam findings to suggest withdrawal, tremors, anxiety, diaphoresis  Start CIWA monitoring (so far scoring 1-2)  IV Ativan taper with symptom triggered Ativan  Spoke with sister Shirley: concern for drug misuse and suicide attempt (Tylenol + ASA + Klonopin + Melatonin) + ETOH, also expresses that she has been ingesting mouthwash.  There is no ISTOP record of Klonopin under the names Juliette Guevara, Juliette Whitaker, Juliette Escalera  Pt has attempted to ETOH rehab in past, but relapsed quickly

## 2021-10-03 NOTE — PROGRESS NOTE ADULT - PROBLEM SELECTOR PLAN 3
- Pt likely with underlying emphysema/COPD given longstanding tobacco use  - Start Duonebs q6hrs alondra for now  - Also trial Lasix 20mg IV x1 as pt was on standing fluids

## 2021-10-03 NOTE — PROGRESS NOTE ADULT - PROBLEM SELECTOR PLAN 4
WBC 14.04 with history if vomiting, afebrile, no new labs today   Monitor off abx for now   If spikes temp would cover with Zosyn

## 2021-10-03 NOTE — PROGRESS NOTE ADULT - PROBLEM SELECTOR PLAN 2
Patient endorsed "some audiovisual hallucination in the past, with bright lights and people crowded around her and speaking in voices that she does not understand."  Currently not endorsing hallucinations however not engaging much in conversation  Psych following for ongoing evaluation, ?possible inpt psych

## 2021-10-04 LAB
ALBUMIN SERPL ELPH-MCNC: 2.9 G/DL — LOW (ref 3.3–5)
ALP SERPL-CCNC: 85 U/L — SIGNIFICANT CHANGE UP (ref 40–120)
ALT FLD-CCNC: 40 U/L — HIGH (ref 4–33)
ANION GAP SERPL CALC-SCNC: 8 MMOL/L — SIGNIFICANT CHANGE UP (ref 7–14)
AST SERPL-CCNC: 45 U/L — HIGH (ref 4–32)
BILIRUB SERPL-MCNC: 0.9 MG/DL — SIGNIFICANT CHANGE UP (ref 0.2–1.2)
BUN SERPL-MCNC: 3 MG/DL — LOW (ref 7–23)
CALCIUM SERPL-MCNC: 8.5 MG/DL — SIGNIFICANT CHANGE UP (ref 8.4–10.5)
CHLORIDE SERPL-SCNC: 95 MMOL/L — LOW (ref 98–107)
CO2 SERPL-SCNC: 32 MMOL/L — HIGH (ref 22–31)
CREAT SERPL-MCNC: 0.46 MG/DL — LOW (ref 0.5–1.3)
GLUCOSE SERPL-MCNC: 92 MG/DL — SIGNIFICANT CHANGE UP (ref 70–99)
HCT VFR BLD CALC: 40.6 % — SIGNIFICANT CHANGE UP (ref 34.5–45)
HGB BLD-MCNC: 14.2 G/DL — SIGNIFICANT CHANGE UP (ref 11.5–15.5)
MAGNESIUM SERPL-MCNC: 1.9 MG/DL — SIGNIFICANT CHANGE UP (ref 1.6–2.6)
MCHC RBC-ENTMCNC: 33.3 PG — SIGNIFICANT CHANGE UP (ref 27–34)
MCHC RBC-ENTMCNC: 35 GM/DL — SIGNIFICANT CHANGE UP (ref 32–36)
MCV RBC AUTO: 95.3 FL — SIGNIFICANT CHANGE UP (ref 80–100)
NRBC # BLD: 0 /100 WBCS — SIGNIFICANT CHANGE UP
NRBC # FLD: 0 K/UL — SIGNIFICANT CHANGE UP
PLATELET # BLD AUTO: 216 K/UL — SIGNIFICANT CHANGE UP (ref 150–400)
POTASSIUM SERPL-MCNC: 4.5 MMOL/L — SIGNIFICANT CHANGE UP (ref 3.5–5.3)
POTASSIUM SERPL-SCNC: 4.5 MMOL/L — SIGNIFICANT CHANGE UP (ref 3.5–5.3)
PROT SERPL-MCNC: 5.5 G/DL — LOW (ref 6–8.3)
RBC # BLD: 4.26 M/UL — SIGNIFICANT CHANGE UP (ref 3.8–5.2)
RBC # FLD: 19.3 % — HIGH (ref 10.3–14.5)
SODIUM SERPL-SCNC: 135 MMOL/L — SIGNIFICANT CHANGE UP (ref 135–145)
WBC # BLD: 12.45 K/UL — HIGH (ref 3.8–10.5)
WBC # FLD AUTO: 12.45 K/UL — HIGH (ref 3.8–10.5)

## 2021-10-04 PROCEDURE — 99233 SBSQ HOSP IP/OBS HIGH 50: CPT

## 2021-10-04 PROCEDURE — 99232 SBSQ HOSP IP/OBS MODERATE 35: CPT

## 2021-10-04 RX ORDER — FOLIC ACID 0.8 MG
1 TABLET ORAL DAILY
Refills: 0 | Status: DISCONTINUED | OUTPATIENT
Start: 2021-10-04 | End: 2021-10-12

## 2021-10-04 RX ORDER — THIAMINE MONONITRATE (VIT B1) 100 MG
500 TABLET ORAL THREE TIMES A DAY
Refills: 0 | Status: DISCONTINUED | OUTPATIENT
Start: 2021-10-04 | End: 2021-10-08

## 2021-10-04 RX ADMIN — Medication 100 MILLIGRAM(S): at 14:42

## 2021-10-04 RX ADMIN — Medication 1.5 MILLIGRAM(S): at 11:07

## 2021-10-04 RX ADMIN — Medication 100 MILLIGRAM(S): at 06:11

## 2021-10-04 RX ADMIN — Medication 1.5 MILLIGRAM(S): at 06:11

## 2021-10-04 RX ADMIN — Medication 3 MILLILITER(S): at 09:46

## 2021-10-04 RX ADMIN — ENOXAPARIN SODIUM 40 MILLIGRAM(S): 100 INJECTION SUBCUTANEOUS at 11:11

## 2021-10-04 RX ADMIN — Medication 1 PATCH: at 11:40

## 2021-10-04 RX ADMIN — Medication 100 MILLIGRAM(S): at 21:41

## 2021-10-04 RX ADMIN — Medication 1.5 MILLIGRAM(S): at 01:39

## 2021-10-04 RX ADMIN — Medication 1 MILLIGRAM(S): at 21:41

## 2021-10-04 RX ADMIN — Medication 1 MILLIGRAM(S): at 14:42

## 2021-10-04 RX ADMIN — Medication 3 MILLILITER(S): at 22:12

## 2021-10-04 RX ADMIN — Medication 1 PATCH: at 20:15

## 2021-10-04 RX ADMIN — Medication 1 PATCH: at 00:40

## 2021-10-04 RX ADMIN — Medication 105 MILLIGRAM(S): at 21:49

## 2021-10-04 RX ADMIN — Medication 1 PATCH: at 06:53

## 2021-10-04 RX ADMIN — Medication 1 MILLIGRAM(S): at 18:40

## 2021-10-04 RX ADMIN — Medication 1 PATCH: at 11:11

## 2021-10-04 NOTE — PROGRESS NOTE ADULT - PROBLEM SELECTOR PLAN 4
WBC 14.04 with history if vomiting, afebrile, no new labs today   Monitor off abx for now   If spikes temp would cover with Zosyn WBC 14.04 with history if vomiting, afebrile, wbc trending down   Monitor off abx for now   If spikes temp would cover with Zosyn

## 2021-10-04 NOTE — PROGRESS NOTE ADULT - SUBJECTIVE AND OBJECTIVE BOX
Patient is a 60y old  Female who presents with a chief complaint of intoxication (03 Oct 2021 10:02)      SUBJECTIVE / OVERNIGHT EVENTS:  CIWA 1-2   MEDICATIONS  (STANDING):  albuterol/ipratropium for Nebulization 3 milliLiter(s) Nebulizer every 6 hours  benzonatate 100 milliGRAM(s) Oral every 8 hours  enoxaparin Injectable 40 milliGRAM(s) SubCutaneous daily  influenza   Vaccine 0.5 milliLiter(s) IntraMuscular once  LORazepam   Injectable 1.5 milliGRAM(s) IV Push every 4 hours  LORazepam   Injectable 1 milliGRAM(s) IV Push every 4 hours  LORazepam   Injectable   IV Push   nicotine -  14 mG/24Hr(s) Patch 1 patch Transdermal daily    MEDICATIONS  (PRN):  acetaminophen   Tablet .. 650 milliGRAM(s) Oral every 6 hours PRN Temp greater or equal to 38C (100.4F), Mild Pain (1 - 3)  aluminum hydroxide/magnesium hydroxide/simethicone Suspension 30 milliLiter(s) Oral every 4 hours PRN Dyspepsia  LORazepam     Tablet 2 milliGRAM(s) Oral every 2 hours PRN Symptom-triggered 2 point increase in CIWA-Ar  LORazepam   Injectable 2 milliGRAM(s) IV Push every 1 hour PRN Symptom-triggered: each CIWA -Ar score 8 or GREATER  melatonin 3 milliGRAM(s) Oral at bedtime PRN Insomnia  ondansetron Injectable 4 milliGRAM(s) IV Push every 8 hours PRN Nausea and/or Vomiting      Vital Signs Last 24 Hrs  T(C): 37.4 (04 Oct 2021 06:03), Max: 37.7 (03 Oct 2021 11:40)  T(F): 99.4 (04 Oct 2021 06:03), Max: 99.9 (03 Oct 2021 11:40)  HR: 86 (04 Oct 2021 10:04) (86 - 98)  BP: 131/81 (04 Oct 2021 06:03) (130/80 - 142/82)  BP(mean): --  RR: 18 (04 Oct 2021 06:03) (18 - 18)  SpO2: 96% (04 Oct 2021 10:04) (96% - 98%)  CAPILLARY BLOOD GLUCOSE        I&O's Summary    03 Oct 2021 07:01  -  04 Oct 2021 07:00  --------------------------------------------------------  IN: 0 mL / OUT: 3000 mL / NET: -3000 mL        PHYSICAL EXAM:  GENERAL: NAD, well-developed  HEAD:  Atraumatic, Normocephalic  EYES: EOMI, PERRLA, conjunctiva and sclera clear  NECK: Supple, No JVD  CHEST/LUNG: Clear to auscultation bilaterally; No wheeze  HEART: Regular rate and rhythm; No murmurs, rubs, or gallops  ABDOMEN: Soft, Nontender, Nondistended; Bowel sounds present  EXTREMITIES:  2+ Peripheral Pulses, No clubbing, cyanosis, or edema  PSYCH: AAOx3  NEUROLOGY: non-focal  SKIN: No rashes or lesions    LABS:                        14.2   12.45 )-----------( 216      ( 04 Oct 2021 07:12 )             40.6     10-04    135  |  95<L>  |  3<L>  ----------------------------<  92  4.5   |  32<H>  |  0.46<L>    Ca    8.5      04 Oct 2021 07:12  Mg     1.90     10-04    TPro  5.5<L>  /  Alb  2.9<L>  /  TBili  0.9  /  DBili  x   /  AST  45<H>  /  ALT  40<H>  /  AlkPhos  85  10-04              RADIOLOGY & ADDITIONAL TESTS:    Imaging Personally Reviewed:    Consultant(s) Notes Reviewed:      Care Discussed with Consultants/Other Providers:   Patient is a 60y old  Female who presents with a chief complaint of intoxication (03 Oct 2021 10:02)      SUBJECTIVE / OVERNIGHT EVENTS: patient seen and examined by bedside, denies headache, dizziness, SOB, CP, Palpitations , N/V/D, abdominal pain  CIWA 1-2   MEDICATIONS  (STANDING):  albuterol/ipratropium for Nebulization 3 milliLiter(s) Nebulizer every 6 hours  benzonatate 100 milliGRAM(s) Oral every 8 hours  enoxaparin Injectable 40 milliGRAM(s) SubCutaneous daily  influenza   Vaccine 0.5 milliLiter(s) IntraMuscular once  LORazepam   Injectable 1.5 milliGRAM(s) IV Push every 4 hours  LORazepam   Injectable 1 milliGRAM(s) IV Push every 4 hours  LORazepam   Injectable   IV Push   nicotine -  14 mG/24Hr(s) Patch 1 patch Transdermal daily    MEDICATIONS  (PRN):  acetaminophen   Tablet .. 650 milliGRAM(s) Oral every 6 hours PRN Temp greater or equal to 38C (100.4F), Mild Pain (1 - 3)  aluminum hydroxide/magnesium hydroxide/simethicone Suspension 30 milliLiter(s) Oral every 4 hours PRN Dyspepsia  LORazepam     Tablet 2 milliGRAM(s) Oral every 2 hours PRN Symptom-triggered 2 point increase in CIWA-Ar  LORazepam   Injectable 2 milliGRAM(s) IV Push every 1 hour PRN Symptom-triggered: each CIWA -Ar score 8 or GREATER  melatonin 3 milliGRAM(s) Oral at bedtime PRN Insomnia  ondansetron Injectable 4 milliGRAM(s) IV Push every 8 hours PRN Nausea and/or Vomiting      Vital Signs Last 24 Hrs  T(C): 37.4 (04 Oct 2021 06:03), Max: 37.7 (03 Oct 2021 11:40)  T(F): 99.4 (04 Oct 2021 06:03), Max: 99.9 (03 Oct 2021 11:40)  HR: 86 (04 Oct 2021 10:04) (86 - 98)  BP: 131/81 (04 Oct 2021 06:03) (130/80 - 142/82)  BP(mean): --  RR: 18 (04 Oct 2021 06:03) (18 - 18)  SpO2: 96% (04 Oct 2021 10:04) (96% - 98%)  CAPILLARY BLOOD GLUCOSE        I&O's Summary    03 Oct 2021 07:01  -  04 Oct 2021 07:00  --------------------------------------------------------  IN: 0 mL / OUT: 3000 mL / NET: -3000 mL        PHYSICAL EXAM:  CONSTITUTIONAL: NAD, well-developed, well-groomed  EYES: PERRLA; conjunctiva and sclera clear  RESPIRATORY: Diffuse wheezing in all lung fields   CARDIOVASCULAR: Regular rate and rhythm, normal S1 and S2, no murmur/rub/gallop; No lower extremity edema  ABDOMEN: Nontender to palpation, normoactive bowel sounds, no rebound/guarding  MUSCLOSKELETAL:  No clubbing or cyanosis of digits; no joint swelling or tenderness to palpation  PSYCH: A+O to person, place, and time; affect appropriate  NEUROLOGY: CN 2-12 are intact and symmetric; no gross sensory deficits; +tremors with arms extended,   SKIN: No rashes; no palpable lesions    LABS:                        14.2   12.45 )-----------( 216      ( 04 Oct 2021 07:12 )             40.6     10-04    135  |  95<L>  |  3<L>  ----------------------------<  92  4.5   |  32<H>  |  0.46<L>    Ca    8.5      04 Oct 2021 07:12  Mg     1.90     10-04    TPro  5.5<L>  /  Alb  2.9<L>  /  TBili  0.9  /  DBili  x   /  AST  45<H>  /  ALT  40<H>  /  AlkPhos  85  10-04              RADIOLOGY & ADDITIONAL TESTS:  < from: US Abdomen Complete (US Abdomen Complete .) (10.02.21 @ 17:40) >    Liver: Diffusely increased echogenicity, suggestive of hepatic steatosis.  Bile ducts: Normal caliber. Common bile duct measures 7 mm.  Gallbladder: Cholecystectomy.  Pancreas: Not visualized.  Spleen: 8.1 cm. Within normal limits.  Right kidney: 12.9 cm. No hydronephrosis. Limited evaluation of the lower pole secondary to overlying bowel gas.  Left kidney: 10.4 cm.  No hydronephrosis.  Ascites: None.  Aorta and IVC: Visualized portions of the inferior vena cava arewithin normal limits. Aorta was obscured by overlying bowel gas.    IMPRESSION:  Diffusely increased echogenicity of the liver parenchyma, likely secondary to hepatic steatosis.    No ascites.    Pancreas and aorta were obscured by overlying bowel gas.    < end of copied text >    Imaging Personally Reviewed:    Consultant(s) Notes Reviewed:      Care Discussed with Consultants/Other Providers:

## 2021-10-04 NOTE — BH CONSULTATION LIAISON PROGRESS NOTE - OTHER
endorses vh when intoxicated and or when attempting sobriety slight nystagmus distracted at times appears to be withholding and or purposefully refusing to recall information

## 2021-10-04 NOTE — PROGRESS NOTE ADULT - PROBLEM SELECTOR PLAN 1
ETOH level high on admission  Now with exam findings to suggest withdrawal, tremors, anxiety, diaphoresis  Start CIWA monitoring (so far scoring 1-2)  IV Ativan taper with symptom triggered Ativan  Spoke with sister Shirley: concern for drug misuse and suicide attempt (Tylenol + ASA + Klonopin + Melatonin) + ETOH, also expresses that she has been ingesting mouthwash.  There is no ISTOP record of Klonopin under the names Juliette Guevara, Juliette Whitaker, Juliette Escalera  Pt has attempted to ETOH rehab in past, but relapsed quickly ETOH level high on admission  Now with exam findings to suggest withdrawal, tremors, anxiety, diaphoresis  Start CIWA monitoring (so far scoring 1-2)  IV Ativan taper with symptom triggered Ativan  sister Shirley concerned  for drug misuse and suicide attempt (Tylenol + ASA + Klonopin + Melatonin) + ETOH, also expresses that she has been ingesting mouthwash.  There is no ISTOP record of Klonopin under the names Juliette Guevara, Juliette Whitaker, Juliette Escalera  Pt has attempted to ETOH rehab in past, but relapsed quickly  - will add Thiamine, MVi and folic acid

## 2021-10-04 NOTE — PROGRESS NOTE ADULT - PROBLEM SELECTOR PLAN 5
Likely in setting of ETOH abuse, pt with some abdominal distention  Check RUQ sonogram Likely in setting of ETOH abuse, pt with some abdominal distention  RUQ sonogram noted, shows Diffusely increased echogenicity of the liver parenchyma, likely secondary to hepatic steatosis.

## 2021-10-04 NOTE — BH CONSULTATION LIAISON PROGRESS NOTE - NSBHASSESSMENTFT_PSY_ALL_CORE
59yo single, disabled, female domiciled with sister with PMHx etoh dependence/abuse (sober 3 years-starting drinking 5 weeks ago-4 bottles Vodka daily), 2 detox admissions for etoh, hx visual hallucinations when drinking and or trying sobriety, PPHx depression, anxiety f/b outpatient provider, Dr. Youngblood, prescribed Klonopin, hx trauma, no hx legal issues, no hx inpatient psychiatric admission.     [facility] consulted for si. Primary team report patient's sister called with concern patient is suicidal. No reported behavior issues.     Chart reviewed. Patient seen, sleeping, easily arousable, a&o, calm, cooperative, at times distracted, however easily redirected. She expresses disappointment in self for drinking again, endorses desire for outpatient treatment.  Patient denies ah/vh, however endorses vh when she is drinking and or trying for sobriety.       PLAN:  -c/w 1:1 CO-sister expressed concern for suicidality-sister advocating for psychiatric admission  -c/w Ativan standing taper per primary team-may need to adjust according to patient's response to treatment  -c/w Ativan prn ciwa
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
## 2021-10-05 LAB
ALBUMIN SERPL ELPH-MCNC: 2.9 G/DL — LOW (ref 3.3–5)
ALP SERPL-CCNC: 83 U/L — SIGNIFICANT CHANGE UP (ref 40–120)
ALT FLD-CCNC: 34 U/L — HIGH (ref 4–33)
ANION GAP SERPL CALC-SCNC: 11 MMOL/L — SIGNIFICANT CHANGE UP (ref 7–14)
AST SERPL-CCNC: 34 U/L — HIGH (ref 4–32)
BILIRUB SERPL-MCNC: 0.6 MG/DL — SIGNIFICANT CHANGE UP (ref 0.2–1.2)
BUN SERPL-MCNC: 5 MG/DL — LOW (ref 7–23)
CALCIUM SERPL-MCNC: 8.2 MG/DL — LOW (ref 8.4–10.5)
CHLORIDE SERPL-SCNC: 101 MMOL/L — SIGNIFICANT CHANGE UP (ref 98–107)
CO2 SERPL-SCNC: 26 MMOL/L — SIGNIFICANT CHANGE UP (ref 22–31)
CREAT SERPL-MCNC: 0.46 MG/DL — LOW (ref 0.5–1.3)
GLUCOSE SERPL-MCNC: 161 MG/DL — HIGH (ref 70–99)
HCT VFR BLD CALC: 40.8 % — SIGNIFICANT CHANGE UP (ref 34.5–45)
HGB BLD-MCNC: 14 G/DL — SIGNIFICANT CHANGE UP (ref 11.5–15.5)
MAGNESIUM SERPL-MCNC: 1.7 MG/DL — SIGNIFICANT CHANGE UP (ref 1.6–2.6)
MCHC RBC-ENTMCNC: 33.1 PG — SIGNIFICANT CHANGE UP (ref 27–34)
MCHC RBC-ENTMCNC: 34.3 GM/DL — SIGNIFICANT CHANGE UP (ref 32–36)
MCV RBC AUTO: 96.5 FL — SIGNIFICANT CHANGE UP (ref 80–100)
NRBC # BLD: 0 /100 WBCS — SIGNIFICANT CHANGE UP
NRBC # FLD: 0 K/UL — SIGNIFICANT CHANGE UP
PHOSPHATE SERPL-MCNC: 2.5 MG/DL — SIGNIFICANT CHANGE UP (ref 2.5–4.5)
PLATELET # BLD AUTO: 197 K/UL — SIGNIFICANT CHANGE UP (ref 150–400)
POTASSIUM SERPL-MCNC: 3.9 MMOL/L — SIGNIFICANT CHANGE UP (ref 3.5–5.3)
POTASSIUM SERPL-SCNC: 3.9 MMOL/L — SIGNIFICANT CHANGE UP (ref 3.5–5.3)
PROT SERPL-MCNC: 5.5 G/DL — LOW (ref 6–8.3)
RBC # BLD: 4.23 M/UL — SIGNIFICANT CHANGE UP (ref 3.8–5.2)
RBC # FLD: 19.1 % — HIGH (ref 10.3–14.5)
SODIUM SERPL-SCNC: 138 MMOL/L — SIGNIFICANT CHANGE UP (ref 135–145)
WBC # BLD: 11.24 K/UL — HIGH (ref 3.8–10.5)
WBC # FLD AUTO: 11.24 K/UL — HIGH (ref 3.8–10.5)

## 2021-10-05 PROCEDURE — 99233 SBSQ HOSP IP/OBS HIGH 50: CPT

## 2021-10-05 PROCEDURE — 71045 X-RAY EXAM CHEST 1 VIEW: CPT | Mod: 26

## 2021-10-05 RX ADMIN — Medication 0.5 MILLIGRAM(S): at 22:15

## 2021-10-05 RX ADMIN — Medication 105 MILLIGRAM(S): at 22:16

## 2021-10-05 RX ADMIN — Medication 1 MILLIGRAM(S): at 09:11

## 2021-10-05 RX ADMIN — Medication 1 TABLET(S): at 13:08

## 2021-10-05 RX ADMIN — Medication 3 MILLILITER(S): at 10:02

## 2021-10-05 RX ADMIN — Medication 1 MILLIGRAM(S): at 05:08

## 2021-10-05 RX ADMIN — Medication 100 MILLIGRAM(S): at 13:08

## 2021-10-05 RX ADMIN — Medication 3 MILLILITER(S): at 16:40

## 2021-10-05 RX ADMIN — Medication 3 MILLILITER(S): at 21:51

## 2021-10-05 RX ADMIN — Medication 1 PATCH: at 07:48

## 2021-10-05 RX ADMIN — Medication 3 MILLILITER(S): at 04:24

## 2021-10-05 RX ADMIN — Medication 1 MILLIGRAM(S): at 01:56

## 2021-10-05 RX ADMIN — Medication 1 PATCH: at 19:58

## 2021-10-05 RX ADMIN — Medication 100 MILLIGRAM(S): at 05:08

## 2021-10-05 RX ADMIN — Medication 650 MILLIGRAM(S): at 22:29

## 2021-10-05 RX ADMIN — Medication 100 MILLIGRAM(S): at 22:17

## 2021-10-05 RX ADMIN — ENOXAPARIN SODIUM 40 MILLIGRAM(S): 100 INJECTION SUBCUTANEOUS at 13:08

## 2021-10-05 RX ADMIN — Medication 1 MILLIGRAM(S): at 13:08

## 2021-10-05 RX ADMIN — Medication 105 MILLIGRAM(S): at 05:08

## 2021-10-05 RX ADMIN — Medication 100 MILLIGRAM(S): at 22:15

## 2021-10-05 RX ADMIN — Medication 3 MILLIGRAM(S): at 22:15

## 2021-10-05 RX ADMIN — Medication 0.5 MILLIGRAM(S): at 18:09

## 2021-10-05 RX ADMIN — Medication 0.5 MILLIGRAM(S): at 13:07

## 2021-10-05 RX ADMIN — Medication 1 PATCH: at 13:08

## 2021-10-05 RX ADMIN — Medication 1 PATCH: at 13:16

## 2021-10-05 RX ADMIN — Medication 105 MILLIGRAM(S): at 13:15

## 2021-10-05 NOTE — BH CONSULTATION LIAISON PROGRESS NOTE - NSBHFUPINTERVALHXFT_PSY_A_CORE
Chart reviewed. Pt seen with frandy present. AA O x 2 (not to year). No overt withdrawal s/s. Says she is here because she "drank too much." Does not want to go to inpatient psychiatry but was informed that it is important to address her overall safety after she is medically cleared/detoxed, particularly as her family is rather worrid.
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
## 2021-10-05 NOTE — PROGRESS NOTE ADULT - PROBLEM SELECTOR PLAN 3
- Pt likely with underlying emphysema/COPD given longstanding tobacco use  - Start Duonebs q6hrs alondra for now  - Also trial Lasix 20mg IV x1 as pt was on standing fluids - Pt likely with underlying emphysema/COPD given longstanding tobacco use  - Start Duonebs q6hrs alondra for now  - Also trial Lasix 20mg IV x1 as pt was on standing fluids  - will check CXR

## 2021-10-05 NOTE — PROGRESS NOTE ADULT - PROBLEM SELECTOR PLAN 1
ETOH level high on admission  Now with exam findings to suggest withdrawal, tremors, anxiety, diaphoresis  Start CIWA monitoring (so far scoring 1-2)  IV Ativan taper with symptom triggered Ativan  sister Shirley concerned  for drug misuse and suicide attempt (Tylenol + ASA + Klonopin + Melatonin) + ETOH, also expresses that she has been ingesting mouthwash.  There is no ISTOP record of Klonopin under the names Juliette Guevara, Juliette Whitaker, Juliette Escalera  Pt has attempted to ETOH rehab in past, but relapsed quickly  - will add Thiamine, MVi and folic acid ETOH level high on admission  Now with exam findings to suggest withdrawal, tremors, anxiety, diaphoresis  Start CIWA monitoring (so far scoring 0-1   IV Ativan taper with symptom triggered Ativan  sister Shirley concerned  for drug misuse and suicide attempt (Tylenol + ASA + Klonopin + Melatonin) + ETOH, also expresses that she has been ingesting mouthwash.  There is no ISTOP record of Klonopin under the names Juliette Guevara, Juliette Whitaker, Juliette Escalera  Pt has attempted to ETOH rehab in past, but relapsed quickly  - will add Thiamine, MVi and folic acid

## 2021-10-05 NOTE — PROGRESS NOTE ADULT - SUBJECTIVE AND OBJECTIVE BOX
Patient is a 60y old  Female who presents with a chief complaint of intoxication (04 Oct 2021 10:29)      SUBJECTIVE / OVERNIGHT EVENTS:  CIWA 0-1   MEDICATIONS  (STANDING):  albuterol/ipratropium for Nebulization 3 milliLiter(s) Nebulizer every 6 hours  benzonatate 100 milliGRAM(s) Oral every 8 hours  enoxaparin Injectable 40 milliGRAM(s) SubCutaneous daily  folic acid 1 milliGRAM(s) Oral daily  influenza   Vaccine 0.5 milliLiter(s) IntraMuscular once  LORazepam   Injectable 0.5 milliGRAM(s) IV Push every 4 hours  LORazepam   Injectable   IV Push   multivitamin 1 Tablet(s) Oral daily  nicotine -  14 mG/24Hr(s) Patch 1 patch Transdermal daily  thiamine IVPB 500 milliGRAM(s) IV Intermittent three times a day    MEDICATIONS  (PRN):  acetaminophen   Tablet .. 650 milliGRAM(s) Oral every 6 hours PRN Temp greater or equal to 38C (100.4F), Mild Pain (1 - 3)  aluminum hydroxide/magnesium hydroxide/simethicone Suspension 30 milliLiter(s) Oral every 4 hours PRN Dyspepsia  LORazepam     Tablet 2 milliGRAM(s) Oral every 2 hours PRN Symptom-triggered 2 point increase in CIWA-Ar  LORazepam   Injectable 2 milliGRAM(s) IV Push every 1 hour PRN Symptom-triggered: each CIWA -Ar score 8 or GREATER  melatonin 3 milliGRAM(s) Oral at bedtime PRN Insomnia  ondansetron Injectable 4 milliGRAM(s) IV Push every 8 hours PRN Nausea and/or Vomiting      Vital Signs Last 24 Hrs  T(C): 37.1 (05 Oct 2021 09:00), Max: 37.2 (04 Oct 2021 14:37)  T(F): 98.8 (05 Oct 2021 09:00), Max: 98.9 (04 Oct 2021 14:37)  HR: 99 (05 Oct 2021 09:00) (80 - 119)  BP: 112/70 (05 Oct 2021 09:00) (112/70 - 136/68)  BP(mean): --  RR: 17 (05 Oct 2021 09:00) (17 - 19)  SpO2: 95% (05 Oct 2021 09:00) (92% - 99%)  CAPILLARY BLOOD GLUCOSE        I&O's Summary      PHYSICAL EXAM:  GENERAL: NAD, well-developed  HEAD:  Atraumatic, Normocephalic  EYES: EOMI, PERRLA, conjunctiva and sclera clear  NECK: Supple, No JVD  CHEST/LUNG: Clear to auscultation bilaterally; No wheeze  HEART: Regular rate and rhythm; No murmurs, rubs, or gallops  ABDOMEN: Soft, Nontender, Nondistended; Bowel sounds present  EXTREMITIES:  2+ Peripheral Pulses, No clubbing, cyanosis, or edema  PSYCH: AAOx3  NEUROLOGY: non-focal  SKIN: No rashes or lesions    LABS:                        14.0   11.24 )-----------( 197      ( 05 Oct 2021 06:58 )             40.8     10-05    138  |  101  |  x   ----------------------------<  x   3.9   |  x   |  x     Ca    8.5      04 Oct 2021 07:12  Mg     1.90     10-04    TPro  5.5<L>  /  Alb  2.9<L>  /  TBili  0.9  /  DBili  x   /  AST  45<H>  /  ALT  40<H>  /  AlkPhos  85  10-04              RADIOLOGY & ADDITIONAL TESTS:    Imaging Personally Reviewed:    Consultant(s) Notes Reviewed:      Care Discussed with Consultants/Other Providers:   Patient is a 60y old  Female who presents with a chief complaint of intoxication (04 Oct 2021 10:29)      SUBJECTIVE / OVERNIGHT EVENTS: patient seen and examined by bedside, denies headache, dizziness, SOB, CP, Palpitations , N/V/D, abdominal pain  denies feelings or intentions to hurt herself   CIWA 0-1   MEDICATIONS  (STANDING):  albuterol/ipratropium for Nebulization 3 milliLiter(s) Nebulizer every 6 hours  benzonatate 100 milliGRAM(s) Oral every 8 hours  enoxaparin Injectable 40 milliGRAM(s) SubCutaneous daily  folic acid 1 milliGRAM(s) Oral daily  influenza   Vaccine 0.5 milliLiter(s) IntraMuscular once  LORazepam   Injectable 0.5 milliGRAM(s) IV Push every 4 hours  LORazepam   Injectable   IV Push   multivitamin 1 Tablet(s) Oral daily  nicotine -  14 mG/24Hr(s) Patch 1 patch Transdermal daily  thiamine IVPB 500 milliGRAM(s) IV Intermittent three times a day    MEDICATIONS  (PRN):  acetaminophen   Tablet .. 650 milliGRAM(s) Oral every 6 hours PRN Temp greater or equal to 38C (100.4F), Mild Pain (1 - 3)  aluminum hydroxide/magnesium hydroxide/simethicone Suspension 30 milliLiter(s) Oral every 4 hours PRN Dyspepsia  LORazepam     Tablet 2 milliGRAM(s) Oral every 2 hours PRN Symptom-triggered 2 point increase in CIWA-Ar  LORazepam   Injectable 2 milliGRAM(s) IV Push every 1 hour PRN Symptom-triggered: each CIWA -Ar score 8 or GREATER  melatonin 3 milliGRAM(s) Oral at bedtime PRN Insomnia  ondansetron Injectable 4 milliGRAM(s) IV Push every 8 hours PRN Nausea and/or Vomiting      Vital Signs Last 24 Hrs  T(C): 37.1 (05 Oct 2021 09:00), Max: 37.2 (04 Oct 2021 14:37)  T(F): 98.8 (05 Oct 2021 09:00), Max: 98.9 (04 Oct 2021 14:37)  HR: 99 (05 Oct 2021 09:00) (80 - 119)  BP: 112/70 (05 Oct 2021 09:00) (112/70 - 136/68)  BP(mean): --  RR: 17 (05 Oct 2021 09:00) (17 - 19)  SpO2: 95% (05 Oct 2021 09:00) (92% - 99%)      PHYSICAL EXAM:  CONSTITUTIONAL: NAD, well-developed, well-groomed  EYES: PERRLA; conjunctiva and sclera clear  RESPIRATORY: Diffuse wheezing in all lung fields   CARDIOVASCULAR: Regular rate and rhythm, normal S1 and S2, no murmur/rub/gallop; No lower extremity edema  ABDOMEN: Nontender to palpation, normoactive bowel sounds, no rebound/guarding  MUSCLOSKELETAL:  No clubbing or cyanosis of digits; no joint swelling or tenderness to palpation  PSYCH: A+O to person, place, and time; affect appropriate  NEUROLOGY: CN 2-12 are intact and symmetric; no gross sensory deficits; +tremors with arms extended,   SKIN: No rashes; no palpable lesions        LABS:                        14.0   11.24 )-----------( 197      ( 05 Oct 2021 06:58 )             40.8     10-05    10-05    138  |  101  |  5<L>  ----------------------------<  161<H>  3.9   |  26  |  0.46<L>    Ca    8.2<L>      05 Oct 2021 06:58  Phos  2.5     10-05  Mg     1.70     10-05    TPro  5.5<L>  /  Alb  2.9<L>  /  TBili  0.6  /  DBili  x   /  AST  34<H>  /  ALT  34<H>  /  AlkPhos  83  10-05                RADIOLOGY & ADDITIONAL TESTS:    Imaging Personally Reviewed:    Consultant(s) Notes Reviewed:      Care Discussed with Consultants/Other Providers:

## 2021-10-05 NOTE — PROGRESS NOTE ADULT - PROBLEM SELECTOR PLAN 4
WBC 14.04 with history if vomiting, afebrile, wbc trending down   Monitor off abx for now   If spikes temp would cover with Zosyn

## 2021-10-05 NOTE — PROGRESS NOTE ADULT - PROBLEM SELECTOR PLAN 5
Likely in setting of ETOH abuse, pt with some abdominal distention  RUQ sonogram noted, shows Diffusely increased echogenicity of the liver parenchyma, likely secondary to hepatic steatosis. Likely in setting of ETOH abuse, pt with some abdominal distention  RUQ sonogram noted, shows Diffusely increased echogenicity of the liver parenchyma, likely secondary to hepatic steatosis.  Transaminitis improving , will continue to monitor

## 2021-10-06 PROCEDURE — 99233 SBSQ HOSP IP/OBS HIGH 50: CPT

## 2021-10-06 PROCEDURE — 99231 SBSQ HOSP IP/OBS SF/LOW 25: CPT

## 2021-10-06 RX ORDER — NICOTINE POLACRILEX 2 MG
2 GUM BUCCAL
Refills: 0 | Status: DISCONTINUED | OUTPATIENT
Start: 2021-10-06 | End: 2021-10-12

## 2021-10-06 RX ORDER — SENNA PLUS 8.6 MG/1
2 TABLET ORAL AT BEDTIME
Refills: 0 | Status: DISCONTINUED | OUTPATIENT
Start: 2021-10-06 | End: 2021-10-12

## 2021-10-06 RX ORDER — LANOLIN ALCOHOL/MO/W.PET/CERES
6 CREAM (GRAM) TOPICAL ONCE
Refills: 0 | Status: COMPLETED | OUTPATIENT
Start: 2021-10-06 | End: 2021-10-06

## 2021-10-06 RX ADMIN — Medication 3 MILLIGRAM(S): at 21:19

## 2021-10-06 RX ADMIN — Medication 3 MILLILITER(S): at 15:53

## 2021-10-06 RX ADMIN — Medication 100 MILLIGRAM(S): at 21:19

## 2021-10-06 RX ADMIN — Medication 100 MILLIGRAM(S): at 06:51

## 2021-10-06 RX ADMIN — Medication 100 MILLIGRAM(S): at 05:16

## 2021-10-06 RX ADMIN — Medication 100 MILLIGRAM(S): at 12:58

## 2021-10-06 RX ADMIN — Medication 105 MILLIGRAM(S): at 05:16

## 2021-10-06 RX ADMIN — Medication 1 PATCH: at 06:17

## 2021-10-06 RX ADMIN — Medication 0.5 MILLIGRAM(S): at 01:50

## 2021-10-06 RX ADMIN — Medication 105 MILLIGRAM(S): at 13:47

## 2021-10-06 RX ADMIN — Medication 105 MILLIGRAM(S): at 21:18

## 2021-10-06 RX ADMIN — Medication 3 MILLILITER(S): at 09:37

## 2021-10-06 RX ADMIN — Medication 1 PATCH: at 18:55

## 2021-10-06 RX ADMIN — Medication 1 PATCH: at 13:01

## 2021-10-06 RX ADMIN — Medication 0.5 MILLIGRAM(S): at 09:39

## 2021-10-06 RX ADMIN — Medication 1 TABLET(S): at 12:56

## 2021-10-06 RX ADMIN — Medication 0.5 MILLIGRAM(S): at 05:16

## 2021-10-06 RX ADMIN — Medication 1 PATCH: at 12:57

## 2021-10-06 RX ADMIN — Medication 1 MILLIGRAM(S): at 12:57

## 2021-10-06 RX ADMIN — SENNA PLUS 2 TABLET(S): 8.6 TABLET ORAL at 21:19

## 2021-10-06 RX ADMIN — Medication 1 MILLIGRAM(S): at 02:53

## 2021-10-06 RX ADMIN — ENOXAPARIN SODIUM 40 MILLIGRAM(S): 100 INJECTION SUBCUTANEOUS at 12:56

## 2021-10-06 RX ADMIN — Medication 2 MILLIGRAM(S): at 23:24

## 2021-10-06 RX ADMIN — Medication 0.5 MILLIGRAM(S): at 23:24

## 2021-10-06 RX ADMIN — Medication 3 MILLILITER(S): at 21:49

## 2021-10-06 RX ADMIN — Medication 6 MILLIGRAM(S): at 23:24
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
## 2021-10-06 NOTE — BH CONSULTATION LIAISON PROGRESS NOTE - NSBHFUPINTERVALHXFT_PSY_A_CORE
Chart reviewed. Pt given PRN ativan 1 mg 2am oral. AAO x 3 (name, year, reason for admission). Patient is actively diaphoretic, coughing frequently. She answers directly to the questions being asked. States that she is here because " I am an alcoholic." She explains that she had been sober for 3 years, but began drinking about a month ago drinking 2-3 bottles of vodka a day. Patient scored a 4/4 on a CAGE screen. She has a partner who also drinks, but the patient says that her partner "does not get drunk, she drinks like normal." She explains that her and her partner fight verbally very frequently, but they never have physical fights. Patient admits that she has always been anxious for as long as she remembers. 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 She explains that she does not currently have hallucinations, but reports past hallucinations seeing odd lights and people speaking in words that could not be understood. Admits to no current SI/HI.     Chart reviewed. Pt given PRN ativan 1 mg 2am oral. AAO x 3 (name, year, reason for admission). Patient is actively diaphoretic, coughing frequently. She answers directly to the questions being asked. States that she is here because " I am an alcoholic." She explains that she had been sober for 3 years, but began drinking about a month ago drinking 2-3 bottles of vodka a day. Patient scored a 4/4 on a CAGE screen. She has a partner who also drinks, but the patient says that her partner "does not get drunk, she drinks like normal." She explains that her and her partner fight verbally very frequently, but they never have physical fights. Patient admits that she has always been anxious for as long as she remembers. 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 She explains that she does not currently have hallucinations, but reports past hallucinations seeing odd lights and people speaking in words that could not be understood. Admits to no current SI/HI.    Sister called (Shirley Butler) and spoken to over the phone. She recalls a time that the patient had looked off a balcony and she was concerned that the patient would jump. The patients sister does not believe that the patient is currently suicidal and has no imminent safety concerns. She does not think that the patient needs to have a 1:1 observer. She confirms that the patient went to rehabilitation 3 years ago for her drinking. She states that the patient does have a partner that also lives with them.    Chart reviewed. Pt given PRN ativan 1 mg 2am oral. AAO x 3 (name, year, reason for admission). Patient is actively diaphoretic, coughing frequently. She answers directly to the questions being asked. States that she is here because " I am an alcoholic." She explains that she had been sober for 3 years, but began drinking about a month ago drinking 2-3 bottles of vodka a day. Patient scored a 4/4 on a CAGE screen. She has a partner who also drinks, but the patient says that her partner "does not get drunk, she drinks like normal." She explains that her and her partner fight verbally very frequently, but they never have physical fights. Patient admits that she has always been anxious for as long as she remembers. 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 She explains that she does not currently have hallucinations, but reports past hallucinations seeing odd lights and people speaking in words that could not be understood. Admits to no current SI/HI.    Sister called (Shirley Butler) and spoken to over the phone. She recalls a time that the patient had looked off a balcony and she was concerned that the patient would jump. The patients sister does not believe that the patient is currently suicidal nor was she suicidal in the past and has no imminent safety concerns. She does not think that the patient needs to have a 1:1 observer. She confirms that the patient went to rehabilitation 3 years ago for her drinking. She states that the patient does have a partner that also lives with them.

## 2021-10-06 NOTE — PROGRESS NOTE ADULT - PROBLEM SELECTOR PLAN 2
Patient endorsed "some audiovisual hallucination in the past, with bright lights and people crowded around her and speaking in voices that she does not understand."  Currently not endorsing hallucinations however not engaging much in conversation  Psych following for ongoing evaluation, ?possible inpt psych Patient endorsed "some audiovisual hallucination in the past, with bright lights and people crowded around her and speaking in voices that she does not understand."  Currently not endorsing hallucinations however not engaging much in conversation  Psych following for ongoing evaluation,, psych recommending inpatient psych after medically cleared

## 2021-10-06 NOTE — PROGRESS NOTE ADULT - PROBLEM SELECTOR PLAN 1
ETOH level high on admission  Now with exam findings to suggest withdrawal, tremors, anxiety, diaphoresis  Start CIWA monitoring (so far scoring 0-1   IV Ativan taper with symptom triggered Ativan  sister Shirley concerned  for drug misuse and suicide attempt (Tylenol + ASA + Klonopin + Melatonin) + ETOH, also expresses that she has been ingesting mouthwash.  There is no ISTOP record of Klonopin under the names Juliette Guevara, Juliette Whitaker, Juliette Escalera  Pt has attempted to ETOH rehab in past, but relapsed quickly  - will add Thiamine, MVi and folic acid ETOH level high on admission  Now with exam findings to suggest withdrawal, tremors, anxiety, diaphoresis  Start CIWA monitoring (so far scoring 0-1 , will expedite taper   IV Ativan taper with symptom triggered Ativan  sister Shirley concerned  for drug misuse and suicide attempt (Tylenol + ASA + Klonopin + Melatonin) + ETOH, also expresses that she has been ingesting mouthwash.  There is no ISTOP record of Klonopin under the names Juliette Guevara, Juliette Whitaker, Juliette Escalera  Pt has attempted to ETOH rehab in past, but relapsed quickly  - c/w Thiamine, MVi and folic acid

## 2021-10-06 NOTE — PROGRESS NOTE ADULT - PROBLEM SELECTOR PLAN 3
- Pt likely with underlying emphysema/COPD given longstanding tobacco use  - Start Duonebs q6hrs alondra for now  - Also trial Lasix 20mg IV x1 as pt was on standing fluids  - will check CXR - Pt likely with underlying emphysema/COPD given longstanding tobacco use  - Start Duonebs q6hrs alondra for now  - Also trial Lasix 20mg IV x1 as pt was on standing fluids  - will f/u CXR

## 2021-10-06 NOTE — PROVIDER CONTACT NOTE (OTHER) - BACKGROUND
Pt admitted for ETOH abuse and withdrawal. Melatonin 3mg given around 2200 and currently unable to sleep.
patient admitted for CIWA

## 2021-10-06 NOTE — PROGRESS NOTE ADULT - SUBJECTIVE AND OBJECTIVE BOX
Patient is a 60y old  Female who presents with a chief complaint of intoxication (05 Oct 2021 09:31)      SUBJECTIVE / OVERNIGHT EVENTS:    MEDICATIONS  (STANDING):  albuterol/ipratropium for Nebulization 3 milliLiter(s) Nebulizer every 6 hours  benzonatate 100 milliGRAM(s) Oral every 8 hours  enoxaparin Injectable 40 milliGRAM(s) SubCutaneous daily  folic acid 1 milliGRAM(s) Oral daily  influenza   Vaccine 0.5 milliLiter(s) IntraMuscular once  LORazepam   Injectable   IV Push   LORazepam   Injectable 0.5 milliGRAM(s) IV Push every 4 hours  LORazepam   Injectable 0.5 milliGRAM(s) IV Push every 12 hours  multivitamin 1 Tablet(s) Oral daily  nicotine -  14 mG/24Hr(s) Patch 1 patch Transdermal daily  thiamine IVPB 500 milliGRAM(s) IV Intermittent three times a day    MEDICATIONS  (PRN):  acetaminophen   Tablet .. 650 milliGRAM(s) Oral every 6 hours PRN Temp greater or equal to 38C (100.4F), Mild Pain (1 - 3)  aluminum hydroxide/magnesium hydroxide/simethicone Suspension 30 milliLiter(s) Oral every 4 hours PRN Dyspepsia  guaiFENesin Oral Liquid (Sugar-Free) 100 milliGRAM(s) Oral every 6 hours PRN Cough  LORazepam     Tablet 2 milliGRAM(s) Oral every 2 hours PRN Symptom-triggered 2 point increase in CIWA-Ar  LORazepam   Injectable 2 milliGRAM(s) IV Push every 1 hour PRN Symptom-triggered: each CIWA -Ar score 8 or GREATER  melatonin 3 milliGRAM(s) Oral at bedtime PRN Insomnia  ondansetron Injectable 4 milliGRAM(s) IV Push every 8 hours PRN Nausea and/or Vomiting      Vital Signs Last 24 Hrs  T(C): 36.7 (06 Oct 2021 05:15), Max: 37.1 (05 Oct 2021 09:00)  T(F): 98.1 (06 Oct 2021 05:15), Max: 98.8 (05 Oct 2021 09:00)  HR: 104 (06 Oct 2021 05:15) (92 - 128)  BP: 106/59 (06 Oct 2021 05:15) (106/59 - 119/62)  BP(mean): --  RR: 17 (06 Oct 2021 05:15) (17 - 17)  SpO2: 96% (06 Oct 2021 05:15) (95% - 100%)  CAPILLARY BLOOD GLUCOSE        I&O's Summary      PHYSICAL EXAM:  GENERAL: NAD, well-developed  HEAD:  Atraumatic, Normocephalic  EYES: EOMI, PERRLA, conjunctiva and sclera clear  NECK: Supple, No JVD  CHEST/LUNG: Clear to auscultation bilaterally; No wheeze  HEART: Regular rate and rhythm; No murmurs, rubs, or gallops  ABDOMEN: Soft, Nontender, Nondistended; Bowel sounds present  EXTREMITIES:  2+ Peripheral Pulses, No clubbing, cyanosis, or edema  PSYCH: AAOx3  NEUROLOGY: non-focal  SKIN: No rashes or lesions    LABS:                        14.0   11.24 )-----------( 197      ( 05 Oct 2021 06:58 )             40.8     10-05    138  |  101  |  5<L>  ----------------------------<  161<H>  3.9   |  26  |  0.46<L>    Ca    8.2<L>      05 Oct 2021 06:58  Phos  2.5     10-05  Mg     1.70     10-05    TPro  5.5<L>  /  Alb  2.9<L>  /  TBili  0.6  /  DBili  x   /  AST  34<H>  /  ALT  34<H>  /  AlkPhos  83  10-05              RADIOLOGY & ADDITIONAL TESTS:    Imaging Personally Reviewed:    Consultant(s) Notes Reviewed:      Care Discussed with Consultants/Other Providers:   Patient is a 60y old  Female who presents with a chief complaint of intoxication (05 Oct 2021 09:31)      SUBJECTIVE / OVERNIGHT EVENTS: patient seen and examined by bedside, pt says she wants to sleep as did not sleep well last night , pt received PRN Ativan overnight for anxiety  , denies headache, dizziness, SOB, CP, Palpitations , N/V/D, abdominal pain  denies feelings or intentions to hurt herself   CIWA#  0           MEDICATIONS  (STANDING):  albuterol/ipratropium for Nebulization 3 milliLiter(s) Nebulizer every 6 hours  benzonatate 100 milliGRAM(s) Oral every 8 hours  enoxaparin Injectable 40 milliGRAM(s) SubCutaneous daily  folic acid 1 milliGRAM(s) Oral daily  influenza   Vaccine 0.5 milliLiter(s) IntraMuscular once  LORazepam   Injectable   IV Push   LORazepam   Injectable 0.5 milliGRAM(s) IV Push every 4 hours  LORazepam   Injectable 0.5 milliGRAM(s) IV Push every 12 hours  multivitamin 1 Tablet(s) Oral daily  nicotine -  14 mG/24Hr(s) Patch 1 patch Transdermal daily  thiamine IVPB 500 milliGRAM(s) IV Intermittent three times a day    MEDICATIONS  (PRN):  acetaminophen   Tablet .. 650 milliGRAM(s) Oral every 6 hours PRN Temp greater or equal to 38C (100.4F), Mild Pain (1 - 3)  aluminum hydroxide/magnesium hydroxide/simethicone Suspension 30 milliLiter(s) Oral every 4 hours PRN Dyspepsia  guaiFENesin Oral Liquid (Sugar-Free) 100 milliGRAM(s) Oral every 6 hours PRN Cough  LORazepam     Tablet 2 milliGRAM(s) Oral every 2 hours PRN Symptom-triggered 2 point increase in CIWA-Ar  LORazepam   Injectable 2 milliGRAM(s) IV Push every 1 hour PRN Symptom-triggered: each CIWA -Ar score 8 or GREATER  melatonin 3 milliGRAM(s) Oral at bedtime PRN Insomnia  ondansetron Injectable 4 milliGRAM(s) IV Push every 8 hours PRN Nausea and/or Vomitingdenies feelings or intentions to hurt herself   CIWA 0-1     Vital Signs Last 24 Hrs  T(C): 36.7 (06 Oct 2021 05:15), Max: 37.1 (05 Oct 2021 09:00)  T(F): 98.1 (06 Oct 2021 05:15), Max: 98.8 (05 Oct 2021 09:00)  HR: 104 (06 Oct 2021 05:15) (92 - 128)  BP: 106/59 (06 Oct 2021 05:15) (106/59 - 119/62)  BP(mean): --  RR: 17 (06 Oct 2021 05:15) (17 - 17)  SpO2: 96% (06 Oct 2021 05:15) (95% - 100%)  CAPILLARY BLOOD GLUCOSE        I&O's Summary        PHYSICAL EXAM:  CONSTITUTIONAL: NAD, well-developed,   EYES: PERRLA; conjunctiva and sclera clear  RESPIRATORY: Diffuse wheezing in all lung fields   CARDIOVASCULAR: Regular rate and rhythm, normal S1 and S2, no murmur/rub/gallop; No lower extremity edema  ABDOMEN: Nontender to palpation, normoactive bowel sounds, no rebound/guarding  MUSCULOSKELETAL:  No clubbing or cyanosis of digits; no joint swelling or tenderness to palpation  PSYCH: A+O to person, place, and time; affect appropriate  NEUROLOGY: CN 2-12 are intact and symmetric; no gross sensory deficits; +tremors with arms extended,   SKIN: No rashes; no palpable lesions      LABS:                        14.0   11.24 )-----------( 197      ( 05 Oct 2021 06:58 )             40.8     10-05    138  |  101  |  5<L>  ----------------------------<  161<H>  3.9   |  26  |  0.46<L>    Ca    8.2<L>      05 Oct 2021 06:58  Phos  2.5     10-05  Mg     1.70     10-05    TPro  5.5<L>  /  Alb  2.9<L>  /  TBili  0.6  /  DBili  x   /  AST  34<H>  /  ALT  34<H>  /  AlkPhos  83  10-05              RADIOLOGY & ADDITIONAL TESTS:    Imaging Personally Reviewed:    Consultant(s) Notes Reviewed:  psychiatry     Care Discussed with Consultants/Other Providers:

## 2021-10-06 NOTE — PROVIDER CONTACT NOTE (OTHER) - ASSESSMENT
Pt is A&Ox3. VS Stable. Anxious behavior. Currently on 1:1.
patient tachycardic. denies any signs or symptoms related to tachycardia, CIWA score 0. Patient is resting in bed.

## 2021-10-06 NOTE — PROGRESS NOTE ADULT - PROBLEM SELECTOR PLAN 7
DVT ppx: lovenox  dispo : pending ativan taper and Psych clearance DVT ppx: lovenox  dispo : pending ativan taper to inpatient Psych   plan of care d/w patient and ACP

## 2021-10-06 NOTE — PROGRESS NOTE ADULT - PROBLEM SELECTOR PLAN 5
Likely in setting of ETOH abuse, pt with some abdominal distention  RUQ sonogram noted, shows Diffusely increased echogenicity of the liver parenchyma, likely secondary to hepatic steatosis.  Transaminitis improving , will continue to monitor

## 2021-10-07 ENCOUNTER — TRANSCRIPTION ENCOUNTER (OUTPATIENT)
Age: 61
End: 2021-10-07

## 2021-10-07 DIAGNOSIS — F19.94 OTHER PSYCHOACTIVE SUBSTANCE USE, UNSPECIFIED WITH PSYCHOACTIVE SUBSTANCE-INDUCED MOOD DISORDER: ICD-10-CM

## 2021-10-07 PROBLEM — F17.200 NICOTINE DEPENDENCE, UNSPECIFIED, UNCOMPLICATED: Chronic | Status: ACTIVE | Noted: 2021-10-02

## 2021-10-07 PROBLEM — F10.10 ALCOHOL ABUSE, UNCOMPLICATED: Chronic | Status: ACTIVE | Noted: 2021-10-02

## 2021-10-07 PROBLEM — F41.9 ANXIETY DISORDER, UNSPECIFIED: Chronic | Status: ACTIVE | Noted: 2021-10-02

## 2021-10-07 LAB
ALBUMIN SERPL ELPH-MCNC: 2.9 G/DL — LOW (ref 3.3–5)
ALP SERPL-CCNC: 86 U/L — SIGNIFICANT CHANGE UP (ref 40–120)
ALT FLD-CCNC: 60 U/L — HIGH (ref 4–33)
ANION GAP SERPL CALC-SCNC: 12 MMOL/L — SIGNIFICANT CHANGE UP (ref 7–14)
AST SERPL-CCNC: 68 U/L — HIGH (ref 4–32)
BILIRUB SERPL-MCNC: 0.7 MG/DL — SIGNIFICANT CHANGE UP (ref 0.2–1.2)
BUN SERPL-MCNC: 6 MG/DL — LOW (ref 7–23)
CALCIUM SERPL-MCNC: 8.8 MG/DL — SIGNIFICANT CHANGE UP (ref 8.4–10.5)
CHLORIDE SERPL-SCNC: 99 MMOL/L — SIGNIFICANT CHANGE UP (ref 98–107)
CO2 SERPL-SCNC: 24 MMOL/L — SIGNIFICANT CHANGE UP (ref 22–31)
CREAT SERPL-MCNC: 0.5 MG/DL — SIGNIFICANT CHANGE UP (ref 0.5–1.3)
GLUCOSE SERPL-MCNC: 116 MG/DL — HIGH (ref 70–99)
HCT VFR BLD CALC: 39.7 % — SIGNIFICANT CHANGE UP (ref 34.5–45)
HGB BLD-MCNC: 13.3 G/DL — SIGNIFICANT CHANGE UP (ref 11.5–15.5)
MAGNESIUM SERPL-MCNC: 1.8 MG/DL — SIGNIFICANT CHANGE UP (ref 1.6–2.6)
MCHC RBC-ENTMCNC: 32.6 PG — SIGNIFICANT CHANGE UP (ref 27–34)
MCHC RBC-ENTMCNC: 33.5 GM/DL — SIGNIFICANT CHANGE UP (ref 32–36)
MCV RBC AUTO: 97.3 FL — SIGNIFICANT CHANGE UP (ref 80–100)
NRBC # BLD: 0 /100 WBCS — SIGNIFICANT CHANGE UP
NRBC # FLD: 0 K/UL — SIGNIFICANT CHANGE UP
PHOSPHATE SERPL-MCNC: 4.9 MG/DL — HIGH (ref 2.5–4.5)
PLATELET # BLD AUTO: 215 K/UL — SIGNIFICANT CHANGE UP (ref 150–400)
POTASSIUM SERPL-MCNC: 4.3 MMOL/L — SIGNIFICANT CHANGE UP (ref 3.5–5.3)
POTASSIUM SERPL-SCNC: 4.3 MMOL/L — SIGNIFICANT CHANGE UP (ref 3.5–5.3)
PROT SERPL-MCNC: 5.8 G/DL — LOW (ref 6–8.3)
RBC # BLD: 4.08 M/UL — SIGNIFICANT CHANGE UP (ref 3.8–5.2)
RBC # FLD: 18.2 % — HIGH (ref 10.3–14.5)
SODIUM SERPL-SCNC: 135 MMOL/L — SIGNIFICANT CHANGE UP (ref 135–145)
WBC # BLD: 9.22 K/UL — SIGNIFICANT CHANGE UP (ref 3.8–10.5)
WBC # FLD AUTO: 9.22 K/UL — SIGNIFICANT CHANGE UP (ref 3.8–10.5)

## 2021-10-07 PROCEDURE — 99232 SBSQ HOSP IP/OBS MODERATE 35: CPT

## 2021-10-07 PROCEDURE — 99231 SBSQ HOSP IP/OBS SF/LOW 25: CPT

## 2021-10-07 RX ORDER — LANOLIN ALCOHOL/MO/W.PET/CERES
6 CREAM (GRAM) TOPICAL ONCE
Refills: 0 | Status: COMPLETED | OUTPATIENT
Start: 2021-10-07 | End: 2021-10-07

## 2021-10-07 RX ORDER — IPRATROPIUM/ALBUTEROL SULFATE 18-103MCG
3 AEROSOL WITH ADAPTER (GRAM) INHALATION
Qty: 0 | Refills: 0 | DISCHARGE
Start: 2021-10-07

## 2021-10-07 RX ORDER — LANOLIN ALCOHOL/MO/W.PET/CERES
1 CREAM (GRAM) TOPICAL
Qty: 0 | Refills: 0 | DISCHARGE
Start: 2021-10-07

## 2021-10-07 RX ORDER — NICOTINE POLACRILEX 2 MG
1 GUM BUCCAL
Qty: 0 | Refills: 0 | DISCHARGE
Start: 2021-10-07

## 2021-10-07 RX ORDER — ACETAMINOPHEN 500 MG
2 TABLET ORAL
Qty: 0 | Refills: 0 | DISCHARGE
Start: 2021-10-07

## 2021-10-07 RX ORDER — SENNA PLUS 8.6 MG/1
2 TABLET ORAL
Qty: 0 | Refills: 0 | DISCHARGE
Start: 2021-10-07

## 2021-10-07 RX ORDER — FOLIC ACID 0.8 MG
1 TABLET ORAL
Qty: 0 | Refills: 0 | DISCHARGE
Start: 2021-10-07

## 2021-10-07 RX ADMIN — Medication 3 MILLILITER(S): at 04:03

## 2021-10-07 RX ADMIN — Medication 1 TABLET(S): at 12:56

## 2021-10-07 RX ADMIN — Medication 100 MILLIGRAM(S): at 06:01

## 2021-10-07 RX ADMIN — Medication 0.5 MILLIGRAM(S): at 23:34

## 2021-10-07 RX ADMIN — Medication 100 MILLIGRAM(S): at 12:57

## 2021-10-07 RX ADMIN — Medication 1 MILLIGRAM(S): at 12:57

## 2021-10-07 RX ADMIN — Medication 3 MILLIGRAM(S): at 23:34

## 2021-10-07 RX ADMIN — Medication 1 PATCH: at 07:33

## 2021-10-07 RX ADMIN — Medication 1 PATCH: at 13:10

## 2021-10-07 RX ADMIN — SENNA PLUS 2 TABLET(S): 8.6 TABLET ORAL at 22:13

## 2021-10-07 RX ADMIN — Medication 105 MILLIGRAM(S): at 22:13

## 2021-10-07 RX ADMIN — Medication 100 MILLIGRAM(S): at 01:00

## 2021-10-07 RX ADMIN — Medication 100 MILLIGRAM(S): at 08:26

## 2021-10-07 RX ADMIN — Medication 0.5 MILLIGRAM(S): at 06:02

## 2021-10-07 RX ADMIN — Medication 6 MILLIGRAM(S): at 23:34

## 2021-10-07 RX ADMIN — Medication 105 MILLIGRAM(S): at 13:42

## 2021-10-07 RX ADMIN — Medication 1 PATCH: at 12:56

## 2021-10-07 RX ADMIN — Medication 1 PATCH: at 19:02

## 2021-10-07 RX ADMIN — Medication 105 MILLIGRAM(S): at 06:01

## 2021-10-07 RX ADMIN — Medication 0.5 MILLIGRAM(S): at 18:50

## 2021-10-07 RX ADMIN — ENOXAPARIN SODIUM 40 MILLIGRAM(S): 100 INJECTION SUBCUTANEOUS at 12:56

## 2021-10-07 NOTE — PROGRESS NOTE ADULT - PROBLEM SELECTOR PLAN 7
DVT ppx: lovenox  dispo : pending ativan taper to inpatient Psych   plan of care d/w patient and ACP DVT ppx: lovenox  dispo : pending ativan taper to inpatient Psych   plan of care psych, recommend inpatient psych as sister very concerned, pt danger to self,  2PC completed by Psych  case d/w  Psych ANAHI, bakari to [facility], pending bed availability

## 2021-10-07 NOTE — PROGRESS NOTE ADULT - PROBLEM SELECTOR PLAN 2
Patient endorsed "some audiovisual hallucination in the past, with bright lights and people crowded around her and speaking in voices that she does not understand."  Currently not endorsing hallucinations however not engaging much in conversation  Psych following for ongoing evaluation,, psych recommending inpatient psych after medically cleared

## 2021-10-07 NOTE — DISCHARGE NOTE PROVIDER - HOSPITAL COURSE
60F h/o Anxiety, Alcohol/tobacco dependence p/w alcohol intoxication and psychosis     Alcohol intoxication- Alcohol level 290  - WA protocol, ativan taper until 10/8, s/p IVF    Psychosis - 1:1  - Psych consulted for SI    Leukocytosis- WBC 14.04 with history if vomiting, afebrile  - CXR- Clear lungs. Monitor, improving    Anxiety- Chronic unstable  - Called pharmacy to confirm: klonopin 0.5 mg TID prescribed by Dr Kaur Pardo   - Fills [facility] @ [phone]    Elevated LFT- Abd US- Diffusely increased echogenicity of the liver parenchyma, likely secondary to hepatic steatosis. No ascites. Pancreas and aorta were obscured by overlying bowel gas    Dispo -Inpatient psychiatric admission. 2pc is in chart, spoke to Florence, no bed available   60 F PMHx anxiety, alcohol/tobacco dependence p/w alcohol intoxication and psychosis. Started on CIWA protocol, Ativan taper (completed 10/7).     Psych consulted for SI. Placed on 1:1. Pt initially planned for transfer to [facility] but noted with __________________________    Labs with leukocytosis. and elevated LFTs. CXR negative. AUS with diffusely increased echogenicity of the liver parenchyma, likely secondary to hepatic steatosis. No ascites. Pancreas and aorta were obscured by overlying bowel gas  Improving.          60 F PMHx anxiety, alcohol/tobacco dependence p/w alcohol intoxication and psychosis. Started on CIWA protocol, Ativan taper (completed 10/7).     Psych consulted for SI. Placed on 1:1. Pt initially planned for transfer to [facility] but noted with improvement and no SI. Patient was recs to go home    Labs with leukocytosis. and elevated LFTs. CXR negative. AUS with diffusely increased echogenicity of the liver parenchyma, likely secondary to hepatic steatosis. No ascites. Pancreas and aorta were obscured by overlying bowel gas Improving. Alcohol intoxication- Alcohol level 290. Pt was on CIWA protocol, Ativan taper until 10/8 and IVF. Patient atble for discharge home with outpatient follow up

## 2021-10-07 NOTE — BH CONSULTATION LIAISON PROGRESS NOTE - MSE UNSTRUCTURED FT
AA O x 2 (not to year). No overt withdrawal s/s. Says she is here because she "drank too much." No current SI/HI/AVH.
unable to assess mmse or mse---- patient refused interview.

## 2021-10-07 NOTE — BH CONSULTATION LIAISON PROGRESS NOTE - NSBHFUPINTERVALHXFT_PSY_A_CORE
Collateral obtained from sister Shirley (home= [phone], cell: [phone]) using a  (# [id number]). She expresses notable safety concerns for her sister. States that patient has been rather rios/depressed lately, has been drinking heavily, and sometimes other agents that has alcohol like hand , cough medicines, etc. Sister states that patient even when sober for a few days, is depressed and has been making suicidal gestures and statements-- ' tried to cut her throat, tried to jump out of a balcony, etc. Sister is very concerned for her safety.     Met with patient. Minimizes above concerns, dania with md, and states that she wants to go back to sleep.   Coordinated care with hospitalist Dr Pollock.

## 2021-10-07 NOTE — BH CONSULTATION LIAISON PROGRESS NOTE - NSBHASSESSMENTFT_PSY_ALL_CORE
61yo single, disabled, female domiciled with sister with PMHx etoh dependence/abuse (sober 3 years-starting drinking 5 weeks ago-4 bottles Vodka daily), 2 detox admissions for etoh, hx visual hallucinations when drinking and or trying sobriety, PPHx depression, anxiety f/b outpatient provider, Dr. Youngblood, prescribed Klonopin, hx trauma, no hx legal issues, no hx inpatient psychiatric admission.     Chart reviewed. AAO x 3 (name, year, reason for admission). Patient is actively diaphoretic, coughing frequently. She answers directly to the questions being asked.  Patient scored a 4/4 on a CAGE screen. Patient admits that she has always been anxious for as long as she remembers. She explains that she does not currently have hallucinations, but reports past hallucinations seeing odd lights and people speaking in words that could not be understood. Admits to no current SI/HI. Thought content generally non-bizarre. Thought process linear. Attention is fair.     10/7--- does not engage much with md. Sister very concerned about si and untreated depression.     PLAN:  -c/w 1:1 CO for today.   -c/w thiamine as per primary team   - Dispo: Inpatient psychiatric admission. 2pc is in chart

## 2021-10-07 NOTE — PROGRESS NOTE ADULT - SUBJECTIVE AND OBJECTIVE BOX
Patient is a 60y old  Female who presents with a chief complaint of intoxication (06 Oct 2021 08:07)      SUBJECTIVE / OVERNIGHT EVENTS:    MEDICATIONS  (STANDING):  albuterol/ipratropium for Nebulization 3 milliLiter(s) Nebulizer every 6 hours  benzonatate 100 milliGRAM(s) Oral every 8 hours  enoxaparin Injectable 40 milliGRAM(s) SubCutaneous daily  folic acid 1 milliGRAM(s) Oral daily  influenza   Vaccine 0.5 milliLiter(s) IntraMuscular once  LORazepam   Injectable   IV Push   LORazepam   Injectable 0.5 milliGRAM(s) IV Push every 12 hours  multivitamin 1 Tablet(s) Oral daily  nicotine -  14 mG/24Hr(s) Patch 1 patch Transdermal daily  senna 2 Tablet(s) Oral at bedtime  thiamine IVPB 500 milliGRAM(s) IV Intermittent three times a day    MEDICATIONS  (PRN):  acetaminophen   Tablet .. 650 milliGRAM(s) Oral every 6 hours PRN Temp greater or equal to 38C (100.4F), Mild Pain (1 - 3)  aluminum hydroxide/magnesium hydroxide/simethicone Suspension 30 milliLiter(s) Oral every 4 hours PRN Dyspepsia  guaiFENesin Oral Liquid (Sugar-Free) 100 milliGRAM(s) Oral every 6 hours PRN Cough  LORazepam     Tablet 2 milliGRAM(s) Oral every 2 hours PRN Symptom-triggered 2 point increase in CIWA-Ar  LORazepam   Injectable 2 milliGRAM(s) IV Push every 1 hour PRN Symptom-triggered: each CIWA -Ar score 8 or GREATER  melatonin 3 milliGRAM(s) Oral at bedtime PRN Insomnia  nicotine  Polacrilex Gum 2 milliGRAM(s) Oral four times a day PRN cravings  ondansetron Injectable 4 milliGRAM(s) IV Push every 8 hours PRN Nausea and/or Vomiting      Vital Signs Last 24 Hrs  T(C): 37.1 (07 Oct 2021 05:52), Max: 37.1 (07 Oct 2021 05:52)  T(F): 98.7 (07 Oct 2021 05:52), Max: 98.7 (07 Oct 2021 05:52)  HR: 94 (07 Oct 2021 05:52) (82 - 99)  BP: 109/58 (07 Oct 2021 05:52) (109/58 - 129/66)  BP(mean): --  RR: 18 (07 Oct 2021 05:52) (18 - 18)  SpO2: 94% (07 Oct 2021 05:52) (94% - 100%)  CAPILLARY BLOOD GLUCOSE        I&O's Summary      PHYSICAL EXAM:  GENERAL: NAD, well-developed  HEAD:  Atraumatic, Normocephalic  EYES: EOMI, PERRLA, conjunctiva and sclera clear  NECK: Supple, No JVD  CHEST/LUNG: Clear to auscultation bilaterally; No wheeze  HEART: Regular rate and rhythm; No murmurs, rubs, or gallops  ABDOMEN: Soft, Nontender, Nondistended; Bowel sounds present  EXTREMITIES:  2+ Peripheral Pulses, No clubbing, cyanosis, or edema  PSYCH: AAOx3  NEUROLOGY: non-focal  SKIN: No rashes or lesions    LABS:                        13.3   9.22  )-----------( 215      ( 07 Oct 2021 07:24 )             39.7     10-07    135  |  99  |  6<L>  ----------------------------<  116<H>  4.3   |  24  |  0.50    Ca    8.8      07 Oct 2021 07:24  Phos  4.9     10-07  Mg     1.80     10-07    TPro  5.8<L>  /  Alb  2.9<L>  /  TBili  0.7  /  DBili  x   /  AST  68<H>  /  ALT  60<H>  /  AlkPhos  86  10-07              RADIOLOGY & ADDITIONAL TESTS:    Imaging Personally Reviewed:    Consultant(s) Notes Reviewed:      Care Discussed with Consultants/Other Providers:   Patient is a 60y old  Female who presents with a chief complaint of intoxication (06 Oct 2021 08:07)      SUBJECTIVE / OVERNIGHT EVENTS: patient seen and examined by bedside, denies headache, dizziness, SOB, CP, Palpitations , N/V/D, abdominal pain  pt wants to sleep as not able to sleep well last night       MEDICATIONS  (STANDING):  albuterol/ipratropium for Nebulization 3 milliLiter(s) Nebulizer every 6 hours  benzonatate 100 milliGRAM(s) Oral every 8 hours  enoxaparin Injectable 40 milliGRAM(s) SubCutaneous daily  folic acid 1 milliGRAM(s) Oral daily  influenza   Vaccine 0.5 milliLiter(s) IntraMuscular once  LORazepam   Injectable   IV Push   LORazepam   Injectable 0.5 milliGRAM(s) IV Push every 12 hours  multivitamin 1 Tablet(s) Oral daily  nicotine -  14 mG/24Hr(s) Patch 1 patch Transdermal daily  senna 2 Tablet(s) Oral at bedtime  thiamine IVPB 500 milliGRAM(s) IV Intermittent three times a day    MEDICATIONS  (PRN):  acetaminophen   Tablet .. 650 milliGRAM(s) Oral every 6 hours PRN Temp greater or equal to 38C (100.4F), Mild Pain (1 - 3)  aluminum hydroxide/magnesium hydroxide/simethicone Suspension 30 milliLiter(s) Oral every 4 hours PRN Dyspepsia  guaiFENesin Oral Liquid (Sugar-Free) 100 milliGRAM(s) Oral every 6 hours PRN Cough  LORazepam     Tablet 2 milliGRAM(s) Oral every 2 hours PRN Symptom-triggered 2 point increase in CIWA-Ar  LORazepam   Injectable 2 milliGRAM(s) IV Push every 1 hour PRN Symptom-triggered: each CIWA -Ar score 8 or GREATER  melatonin 3 milliGRAM(s) Oral at bedtime PRN Insomnia  nicotine  Polacrilex Gum 2 milliGRAM(s) Oral four times a day PRN cravings  ondansetron Injectable 4 milliGRAM(s) IV Push every 8 hours PRN Nausea and/or Vomiting      Vital Signs Last 24 Hrs  T(C): 37.1 (07 Oct 2021 05:52), Max: 37.1 (07 Oct 2021 05:52)  T(F): 98.7 (07 Oct 2021 05:52), Max: 98.7 (07 Oct 2021 05:52)  HR: 94 (07 Oct 2021 05:52) (82 - 99)  BP: 109/58 (07 Oct 2021 05:52) (109/58 - 129/66)  BP(mean): --  RR: 18 (07 Oct 2021 05:52) (18 - 18)  SpO2: 94% (07 Oct 2021 05:52) (94% - 100%)    PHYSICAL EXAM:  CONSTITUTIONAL: NAD, well-developed,   EYES: PERRLA; conjunctiva and sclera clear  RESPIRATORY: B/L AE, no wheezing   CARDIOVASCULAR: Regular rate and rhythm, normal S1 and S2, no murmur/rub/gallop; No lower extremity edema  ABDOMEN: Nontender to palpation, normoactive bowel sounds, no rebound/guarding  MUSCULOSKELETAL:  No clubbing or cyanosis of digits; no joint swelling or tenderness to palpation  PSYCH: A+O to person, place, and time; affect appropriate  NEUROLOGY: CN 2-12 are intact and symmetric; no gross sensory deficits; +tremors with arms extended,   SKIN: No rashes; no palpable lesions      LABS:                        13.3   9.22  )-----------( 215      ( 07 Oct 2021 07:24 )             39.7     10-07    135  |  99  |  6<L>  ----------------------------<  116<H>  4.3   |  24  |  0.50    Ca    8.8      07 Oct 2021 07:24  Phos  4.9     10-07  Mg     1.80     10-07    TPro  5.8<L>  /  Alb  2.9<L>  /  TBili  0.7  /  DBili  x   /  AST  68<H>  /  ALT  60<H>  /  AlkPhos  86  10-07              RADIOLOGY & ADDITIONAL TESTS:  < from: Xray Chest 1 View- PORTABLE-Urgent (Xray Chest 1 View- PORTABLE-Urgent .) (10.05.21 @ 17:36) >    INTERPRETATION:  Clear lungs.    < end of copied text >    Imaging Personally Reviewed:    Consultant(s) Notes Reviewed:      Care Discussed with Consultants/Other Providers: psychiatry

## 2021-10-07 NOTE — PROGRESS NOTE ADULT - PROBLEM SELECTOR PLAN 1
ETOH level high on admission  Now with exam findings to suggest withdrawal, tremors, anxiety, diaphoresis  Start CIWA monitoring (so far scoring 0-1 , will expedite taper   IV Ativan taper with symptom triggered Ativan  sister Shirley concerned  for drug misuse and suicide attempt (Tylenol + ASA + Klonopin + Melatonin) + ETOH, also expresses that she has been ingesting mouthwash.  There is no ISTOP record of Klonopin under the names Juliette Guevara, Juliette Whitaker, Juliette Escalera  Pt has attempted to ETOH rehab in past, but relapsed quickly  - c/w Thiamine, MVi and folic acid ETOH level high on admission   exam findings on admission  suggested  withdrawal, tremors, anxiety, diaphoresis   s/p CIWA monitoring    IV Ativan taper with symptom triggered Ativan  sister Shirley concerned  for drug misuse and suicide attempt (Tylenol + ASA + Klonopin + Melatonin) + ETOH, also expresses that she has been ingesting mouthwash.  There is no ISTOP record of Klonopin under the names Juliette Guevara, Juliette Whitaker, Juliette Escalera  Pt has attempted to ETOH rehab in past, but relapsed quickly  - c/w Thiamine, MVi and folic acid

## 2021-10-07 NOTE — PROGRESS NOTE ADULT - PROBLEM SELECTOR PLAN 3
- Pt likely with underlying emphysema/COPD given longstanding tobacco use  - Start Duonebs q6hrs alondra for now  - Also trial Lasix 20mg IV x1 as pt was on standing fluids  - will f/u CXR - Pt likely with underlying emphysema/COPD given longstanding tobacco use  - Start Duonebs q6hrs alondra for now  - Also trial Lasix 20mg IV x1 as pt was on standing fluids  -  CXR with clear lungs

## 2021-10-07 NOTE — DISCHARGE NOTE PROVIDER - PROVIDER TOKENS
FREE:[LAST:[Follow up with PCP as outpatient],PHONE:[(   )    -],FAX:[(   )    -]] FREE:[LAST:[Follow up with PCP as outpatient],PHONE:[(   )    -],FAX:[(   )    -]],PROVIDER:[TOKEN:[[id number]]]

## 2021-10-07 NOTE — DISCHARGE NOTE PROVIDER - NSDCCPCAREPLAN_GEN_ALL_CORE_FT
PRINCIPAL DISCHARGE DIAGNOSIS  Diagnosis: ETOH abuse  Assessment and Plan of Treatment: - Please abstain from alcohol consumption      SECONDARY DISCHARGE DIAGNOSES  Diagnosis: Anxiety  Assessment and Plan of Treatment: - continue taking medications as prescribed. Follow up with Psych as outpatient for further managementg    Diagnosis: Leukocytosis  Assessment and Plan of Treatment: - CXR showed clear lungs, no signs of infection  - resolved     PRINCIPAL DISCHARGE DIAGNOSIS  Diagnosis: ETOH abuse  Assessment and Plan of Treatment: Please follow up at [facility] [phone] if you are in need of assistance with substance abuse and abstinence. Call to make an appointment.        SECONDARY DISCHARGE DIAGNOSES  Diagnosis: Leukocytosis  Assessment and Plan of Treatment: Workup was unremarkable for infectious etiology. Improved. Follow up outpatient PCP in 1-2 weeks for repeat labwork to monitor and further management. Monitor for fevers, chills, abdominal pain, chest pain, shortness of breath.    Diagnosis: Transaminitis  Assessment and Plan of Treatment: Improved. Follow up outpatient PCP in 1-2 weeks for further management. Monitor for abdominal pain, nausea/vomiting/diarrhea, fevers, chills.    Diagnosis: Anxiety  Assessment and Plan of Treatment: Continue your medications as directed and follow up with your PCP and psychiatrist for further evaluation and medical management.       PRINCIPAL DISCHARGE DIAGNOSIS  Diagnosis: ETOH abuse  Assessment and Plan of Treatment: In order to optimize your overall health and prevent adverse events, please abstain from ingesting alcohol. Continue to supplement with recommended vitamins and follow-up with your primary care provider for further medical care. It is highly recommended to attend AA meetings to help create a sober lifestyle. If you need immediate assistance with substance abuse you may contact the [facility] by calling [phone].  [facility]: [phone]. [facility] -[phone]  Please follow up at [facility] [phone] if you are in need of assistance with substance abuse and abstinence. Call to make an appointment      SECONDARY DISCHARGE DIAGNOSES  Diagnosis: Anxiety  Assessment and Plan of Treatment: Continue your medications as directed and follow up with your PCP and psychiatrist for further evaluation and medical management      Diagnosis: Leukocytosis  Assessment and Plan of Treatment: Workup was unremarkable for infectious etiology. Improved. Follow up outpatient PCP in 1-2 weeks for repeat labwork to monitor and further management. Monitor for fevers, chills, abdominal pain, chest pain, shortness of breath    Diagnosis: Transaminitis  Assessment and Plan of Treatment: Improved. Follow up outpatient PCP in 1-2 weeks for further management. Monitor for abdominal pain, nausea/vomiting/diarrhea, fevers, chills

## 2021-10-07 NOTE — DISCHARGE NOTE PROVIDER - CARE PROVIDERS DIRECT ADDRESSES
,DirectAddress_Unknown ,DirectAddress_Unknown,[email or web address]

## 2021-10-07 NOTE — DISCHARGE NOTE PROVIDER - NSDCMRMEDTOKEN_GEN_ALL_CORE_FT
acetaminophen 325 mg oral tablet: 2 tab(s) orally every 6 hours, As needed, Temp greater or equal to 38C (100.4F), Mild Pain (1 - 3)  aluminum hydroxide-magnesium hydroxide 200 mg-200 mg/5 mL oral suspension: 30 milliliter(s) orally every 4 hours, As needed, Dyspepsia  amLODIPine 5 mg oral tablet: 1 tab(s) orally once a day  benzonatate 100 mg oral capsule: 1 cap(s) orally every 8 hours  folic acid 1 mg oral tablet: 1 tab(s) orally once a day  ipratropium-albuterol 0.5 mg-2.5 mg/3 mL inhalation solution: 3 milliliter(s) inhaled every 6 hours  KlonoPIN 0.5 mg oral tablet: 1 tab(s) orally 3 times a day  Per pharmacy, last dispensed 9/14/21  losartan 50 mg oral tablet: 1 tab(s) orally once a day  melatonin 3 mg oral tablet: 1 tab(s) orally once a day (at bedtime), As needed, Insomnia  Multiple Vitamins oral tablet: 1 tab(s) orally once a day  nicotine 14 mg/24 hr transdermal film, extended release: 1 patch transdermal once a day  senna oral tablet: 2 tab(s) orally once a day (at bedtime)   acetaminophen 325 mg oral tablet: 2 tab(s) orally every 6 hours, As needed, Temp greater or equal to 38C (100.4F), Mild Pain (1 - 3)  aluminum hydroxide-magnesium hydroxide 200 mg-200 mg/5 mL oral suspension: 30 milliliter(s) orally every 4 hours, As needed, Dyspepsia  folic acid 1 mg oral tablet: 1 tab(s) orally once a day  melatonin 3 mg oral tablet: 1 tab(s) orally once a day (at bedtime), As needed, Insomnia  Multiple Vitamins oral tablet: 1 tab(s) orally once a day  nicotine 14 mg/24 hr transdermal film, extended release: 1 patch transdermal once a day  senna oral tablet: 2 tab(s) orally once a day (at bedtime)  thiamine 100 mg oral tablet: 1 tab(s) orally once a day  traZODone 50 mg oral tablet: 1 tab(s) orally once a day (at bedtime) MDD:1 tab / day

## 2021-10-07 NOTE — DISCHARGE NOTE PROVIDER - CARE PROVIDER_API CALL
Follow up with PCP as outpatient,   Phone: (   )    -  Fax: (   )    -  Follow Up Time:    Follow up with PCP as outpatient,   Phone: (   )    -  Fax: (   )    -  Follow Up Time:     Philipp Alexander)  Internal Medicine  [phone] [place] [id number]  Phone: [phone]  Fax: [phone]  Follow Up Time:

## 2021-10-07 NOTE — PROGRESS NOTE ADULT - PROBLEM SELECTOR PLAN 5
Likely in setting of ETOH abuse, pt with some abdominal distention  RUQ sonogram noted, shows Diffusely increased echogenicity of the liver parenchyma, likely secondary to hepatic steatosis.  Transaminitis improving , will continue to monitor Likely in setting of ETOH abuse, pt with some abdominal distention  RUQ sonogram noted, shows Diffusely increased echogenicity of the liver parenchyma, likely secondary to hepatic steatosis.  , will continue to monitor

## 2021-10-08 LAB — SARS-COV-2 RNA SPEC QL NAA+PROBE: SIGNIFICANT CHANGE UP

## 2021-10-08 PROCEDURE — 99232 SBSQ HOSP IP/OBS MODERATE 35: CPT

## 2021-10-08 PROCEDURE — 99231 SBSQ HOSP IP/OBS SF/LOW 25: CPT

## 2021-10-08 RX ORDER — TRAZODONE HCL 50 MG
50 TABLET ORAL AT BEDTIME
Refills: 0 | Status: DISCONTINUED | OUTPATIENT
Start: 2021-10-08 | End: 2021-10-12

## 2021-10-08 RX ORDER — THIAMINE MONONITRATE (VIT B1) 100 MG
100 TABLET ORAL DAILY
Refills: 0 | Status: DISCONTINUED | OUTPATIENT
Start: 2021-10-08 | End: 2021-10-12

## 2021-10-08 RX ADMIN — Medication 50 MILLIGRAM(S): at 21:22

## 2021-10-08 RX ADMIN — Medication 0.5 MILLIGRAM(S): at 05:43

## 2021-10-08 RX ADMIN — Medication 1 PATCH: at 13:29

## 2021-10-08 RX ADMIN — Medication 3 MILLILITER(S): at 16:08

## 2021-10-08 RX ADMIN — SENNA PLUS 2 TABLET(S): 8.6 TABLET ORAL at 21:22

## 2021-10-08 RX ADMIN — Medication 100 MILLIGRAM(S): at 13:36

## 2021-10-08 RX ADMIN — ENOXAPARIN SODIUM 40 MILLIGRAM(S): 100 INJECTION SUBCUTANEOUS at 13:25

## 2021-10-08 RX ADMIN — Medication 105 MILLIGRAM(S): at 05:42

## 2021-10-08 RX ADMIN — Medication 1 PATCH: at 06:39

## 2021-10-08 RX ADMIN — Medication 100 MILLIGRAM(S): at 05:43

## 2021-10-08 RX ADMIN — Medication 1 MILLIGRAM(S): at 13:24

## 2021-10-08 RX ADMIN — Medication 1 PATCH: at 19:44

## 2021-10-08 RX ADMIN — Medication 100 MILLIGRAM(S): at 01:43

## 2021-10-08 RX ADMIN — Medication 3 MILLILITER(S): at 10:20

## 2021-10-08 RX ADMIN — Medication 100 MILLIGRAM(S): at 21:22

## 2021-10-08 RX ADMIN — Medication 0.5 MILLIGRAM(S): at 17:44

## 2021-10-08 RX ADMIN — Medication 1 TABLET(S): at 13:24

## 2021-10-08 NOTE — PROGRESS NOTE ADULT - SUBJECTIVE AND OBJECTIVE BOX
Patient is a 60y old  Female who presents with a chief complaint of intoxication (07 Oct 2021 21:16)      SUBJECTIVE / OVERNIGHT EVENTS:    MEDICATIONS  (STANDING):  albuterol/ipratropium for Nebulization 3 milliLiter(s) Nebulizer every 6 hours  benzonatate 100 milliGRAM(s) Oral every 8 hours  enoxaparin Injectable 40 milliGRAM(s) SubCutaneous daily  folic acid 1 milliGRAM(s) Oral daily  influenza   Vaccine 0.5 milliLiter(s) IntraMuscular once  LORazepam   Injectable   IV Push   LORazepam   Injectable 0.5 milliGRAM(s) IV Push every 12 hours  multivitamin 1 Tablet(s) Oral daily  nicotine -  14 mG/24Hr(s) Patch 1 patch Transdermal daily  senna 2 Tablet(s) Oral at bedtime  thiamine IVPB 500 milliGRAM(s) IV Intermittent three times a day    MEDICATIONS  (PRN):  acetaminophen   Tablet .. 650 milliGRAM(s) Oral every 6 hours PRN Temp greater or equal to 38C (100.4F), Mild Pain (1 - 3)  aluminum hydroxide/magnesium hydroxide/simethicone Suspension 30 milliLiter(s) Oral every 4 hours PRN Dyspepsia  guaiFENesin Oral Liquid (Sugar-Free) 100 milliGRAM(s) Oral every 6 hours PRN Cough  LORazepam     Tablet 2 milliGRAM(s) Oral every 2 hours PRN Symptom-triggered 2 point increase in CIWA-Ar  LORazepam   Injectable 2 milliGRAM(s) IV Push every 1 hour PRN Symptom-triggered: each CIWA -Ar score 8 or GREATER  melatonin 3 milliGRAM(s) Oral at bedtime PRN Insomnia  nicotine  Polacrilex Gum 2 milliGRAM(s) Oral four times a day PRN cravings  ondansetron Injectable 4 milliGRAM(s) IV Push every 8 hours PRN Nausea and/or Vomiting      Vital Signs Last 24 Hrs  T(C): 37.3 (08 Oct 2021 05:12), Max: 37.3 (08 Oct 2021 05:12)  T(F): 99.1 (08 Oct 2021 05:12), Max: 99.1 (08 Oct 2021 05:12)  HR: 92 (08 Oct 2021 05:12) (92 - 106)  BP: 104/47 (08 Oct 2021 05:12) (104/47 - 131/74)  BP(mean): --  RR: 18 (08 Oct 2021 05:12) (17 - 18)  SpO2: 94% (08 Oct 2021 05:12) (94% - 99%)  CAPILLARY BLOOD GLUCOSE        I&O's Summary      PHYSICAL EXAM:  GENERAL: NAD, well-developed  HEAD:  Atraumatic, Normocephalic  EYES: EOMI, PERRLA, conjunctiva and sclera clear  NECK: Supple, No JVD  CHEST/LUNG: Clear to auscultation bilaterally; No wheeze  HEART: Regular rate and rhythm; No murmurs, rubs, or gallops  ABDOMEN: Soft, Nontender, Nondistended; Bowel sounds present  EXTREMITIES:  2+ Peripheral Pulses, No clubbing, cyanosis, or edema  PSYCH: AAOx3  NEUROLOGY: non-focal  SKIN: No rashes or lesions    LABS:                        13.3   9.22  )-----------( 215      ( 07 Oct 2021 07:24 )             39.7     10-07    135  |  99  |  6<L>  ----------------------------<  116<H>  4.3   |  24  |  0.50    Ca    8.8      07 Oct 2021 07:24  Phos  4.9     10-07  Mg     1.80     10-07    TPro  5.8<L>  /  Alb  2.9<L>  /  TBili  0.7  /  DBili  x   /  AST  68<H>  /  ALT  60<H>  /  AlkPhos  86  10-07              RADIOLOGY & ADDITIONAL TESTS:    Imaging Personally Reviewed:    Consultant(s) Notes Reviewed:      Care Discussed with Consultants/Other Providers:   Patient is a 60y old  Female who presents with a chief complaint of intoxication (07 Oct 2021 21:16)      SUBJECTIVE / OVERNIGHT EVENTS: patient seen and examined by bedside, p says he has dry cough, denies headache, dizziness, SOB, CP, Palpitations , N/V/D, abdominal pain        MEDICATIONS  (STANDING):  albuterol/ipratropium for Nebulization 3 milliLiter(s) Nebulizer every 6 hours  benzonatate 100 milliGRAM(s) Oral every 8 hours  enoxaparin Injectable 40 milliGRAM(s) SubCutaneous daily  folic acid 1 milliGRAM(s) Oral daily  influenza   Vaccine 0.5 milliLiter(s) IntraMuscular once  LORazepam   Injectable   IV Push   LORazepam   Injectable 0.5 milliGRAM(s) IV Push every 12 hours  multivitamin 1 Tablet(s) Oral daily  nicotine -  14 mG/24Hr(s) Patch 1 patch Transdermal daily  senna 2 Tablet(s) Oral at bedtime  thiamine IVPB 500 milliGRAM(s) IV Intermittent three times a day    MEDICATIONS  (PRN):  acetaminophen   Tablet .. 650 milliGRAM(s) Oral every 6 hours PRN Temp greater or equal to 38C (100.4F), Mild Pain (1 - 3)  aluminum hydroxide/magnesium hydroxide/simethicone Suspension 30 milliLiter(s) Oral every 4 hours PRN Dyspepsia  guaiFENesin Oral Liquid (Sugar-Free) 100 milliGRAM(s) Oral every 6 hours PRN Cough  LORazepam     Tablet 2 milliGRAM(s) Oral every 2 hours PRN Symptom-triggered 2 point increase in CIWA-Ar  LORazepam   Injectable 2 milliGRAM(s) IV Push every 1 hour PRN Symptom-triggered: each CIWA -Ar score 8 or GREATER  melatonin 3 milliGRAM(s) Oral at bedtime PRN Insomnia  nicotine  Polacrilex Gum 2 milliGRAM(s) Oral four times a day PRN cravings  ondansetron Injectable 4 milliGRAM(s) IV Push every 8 hours PRN Nausea and/or Vomiting      Vital Signs Last 24 Hrs  T(C): 37.3 (08 Oct 2021 05:12), Max: 37.3 (08 Oct 2021 05:12)  T(F): 99.1 (08 Oct 2021 05:12), Max: 99.1 (08 Oct 2021 05:12)  HR: 92 (08 Oct 2021 05:12) (92 - 106)  BP: 104/47 (08 Oct 2021 05:12) (104/47 - 131/74)  BP(mean): --  RR: 18 (08 Oct 2021 05:12) (17 - 18)  SpO2: 94% (08 Oct 2021 05:12) (94% - 99%)  CAPILLARY BLOOD GLUCOSE        I&O's Summary    PHYSICAL EXAM:  CONSTITUTIONAL: NAD, well-developed,   EYES: PERRLA; conjunctiva and sclera clear  RESPIRATORY: B/L AE, no wheezing   CARDIOVASCULAR: Regular rate and rhythm, normal S1 and S2, no murmur/rub/gallop; No lower extremity edema  ABDOMEN: Nontender to palpation, normoactive bowel sounds, no rebound/guarding  MUSCULOSKELETAL:  No clubbing or cyanosis of digits; no joint swelling or tenderness to palpation  PSYCH: A+O to person, place, and time; affect appropriate  NEUROLOGY: CN 2-12 are intact and symmetric; no gross sensory deficits; +tremors with arms extended,   SKIN: No rashes; no palpable lesions    LABS:                        13.3   9.22  )-----------( 215      ( 07 Oct 2021 07:24 )             39.7     10-07    135  |  99  |  6<L>  ----------------------------<  116<H>  4.3   |  24  |  0.50    Ca    8.8      07 Oct 2021 07:24  Phos  4.9     10-07  Mg     1.80     10-07    TPro  5.8<L>  /  Alb  2.9<L>  /  TBili  0.7  /  DBili  x   /  AST  68<H>  /  ALT  60<H>  /  AlkPhos  86  10-07              RADIOLOGY & ADDITIONAL TESTS:    Imaging Personally Reviewed:    Consultant(s) Notes Reviewed:  psychiatry     Care Discussed with Consultants/Other Providers: psychiatry

## 2021-10-08 NOTE — BH CONSULTATION LIAISON PROGRESS NOTE - NSBHFUPINTERVALHXFT_PSY_A_CORE
Met with the patient. States that her sleep has been rather poor at night. Minimizes mood symptoms, denies any si when asked today--reliable?. No a/vh or paranoia. Oriented x 3.    Patient aware of inpatient psychiatric admission recommendation.   Care coordinated with ANAHI Ordaz. Informed that [facility] inpatient psychiatric admission should also be looked into

## 2021-10-08 NOTE — BH CONSULTATION LIAISON PROGRESS NOTE - NSBHASSESSMENTFT_PSY_ALL_CORE
59yo single, disabled, female domiciled with sister with PMHx etoh dependence/abuse (sober 3 years-starting drinking 5 weeks ago-4 bottles Vodka daily), 2 detox admissions for etoh, hx visual hallucinations when drinking and or trying sobriety, PPHx depression, anxiety f/b outpatient provider, Dr. Youngblood, prescribed Klonopin, hx trauma, no hx legal issues, no hx inpatient psychiatric admission.     Chart reviewed. AAO x 3 (name, year, reason for admission). Patient is actively diaphoretic, coughing frequently. She answers directly to the questions being asked.  Patient scored a 4/4 on a CAGE screen. Patient admits that she has always been anxious for as long as she remembers. She explains that she does not currently have hallucinations, but reports past hallucinations seeing odd lights and people speaking in words that could not be understood. Admits to no current SI/HI. Thought content generally non-bizarre. Thought process linear. Attention is fair.     10/7--- does not engage much with md. Sister very concerned about si and untreated depression.   10/8-- minimizing alcohol use, minimizing mood or si concerns raised by family    PLAN:  -c/w 1:1 CO  -c/w thiamine as per primary team   - START Trazodone 50mg q HS PO--- for insomnia  - Dispo: Inpatient psychiatric admission. 2pc is in chart

## 2021-10-08 NOTE — PROGRESS NOTE ADULT - PROBLEM SELECTOR PLAN 3
- Pt likely with underlying emphysema/COPD given longstanding tobacco use  - Start Duonebs q6hrs alondra for now  - Also trial Lasix 20mg IV x1 as pt was on standing fluids  -  CXR with clear lungs

## 2021-10-08 NOTE — PROGRESS NOTE ADULT - PROBLEM SELECTOR PLAN 5
Likely in setting of ETOH abuse, pt with some abdominal distention  RUQ sonogram noted, shows Diffusely increased echogenicity of the liver parenchyma, likely secondary to hepatic steatosis.  , will continue to monitor

## 2021-10-08 NOTE — PROGRESS NOTE ADULT - PROBLEM SELECTOR PLAN 7
DVT ppx: lovenox  dispo : pending ativan taper to inpatient Psych   plan of care psych, recommend inpatient psych as sister very concerned, pt danger to self,  2PC completed by Psych  case d/w  Psych ANAHI, bakari to [facility], pending bed availability DVT ppx: lovenox  dispo : pt medically cleared , plan for inpatient Psych admission at [facility], awaiting bed   plan of care psych, recommend inpatient psych as sister very concerned, pt danger to self,  2PC completed by Psych  case d/w  Psych bakari CHRISTIAN to [facility], pending bed availability

## 2021-10-08 NOTE — PROGRESS NOTE ADULT - PROBLEM SELECTOR PLAN 1
ETOH level high on admission   exam findings on admission  suggested  withdrawal, tremors, anxiety, diaphoresis   s/p CIWA monitoring    IV Ativan taper with symptom triggered Ativan  sister Shirley concerned  for drug misuse and suicide attempt (Tylenol + ASA + Klonopin + Melatonin) + ETOH, also expresses that she has been ingesting mouthwash.  There is no ISTOP record of Klonopin under the names Juliette Guevara, Juliette Whitaker, Juliette Escalera  Pt has attempted to ETOH rehab in past, but relapsed quickly  - c/w Thiamine, MVi and folic acid

## 2021-10-08 NOTE — BH CONSULTATION LIAISON PROGRESS NOTE - NSBHFUPINTERVALCCFT_PSY_A_CORE
"I don't sleep well here"
"I'm depressed"
safety maintained on 1:1
On 1:1CO.   Pending [facility] admission

## 2021-10-08 NOTE — PROGRESS NOTE ADULT - PROBLEM SELECTOR PLAN 4
WBC 14.04 with history if vomiting, afebrile, wbc trending down   Monitor off abx for now   If spikes temp would cover with Zosyn WBC 14.04 with history if vomiting, afebrile, now resolved   Monitor off abx for now   If spikes temp would cover with Zosyn

## 2021-10-09 PROCEDURE — 99232 SBSQ HOSP IP/OBS MODERATE 35: CPT

## 2021-10-09 RX ADMIN — Medication 100 MILLIGRAM(S): at 02:31

## 2021-10-09 RX ADMIN — SENNA PLUS 2 TABLET(S): 8.6 TABLET ORAL at 21:16

## 2021-10-09 RX ADMIN — Medication 1 TABLET(S): at 12:58

## 2021-10-09 RX ADMIN — Medication 3 MILLILITER(S): at 10:24

## 2021-10-09 RX ADMIN — ENOXAPARIN SODIUM 40 MILLIGRAM(S): 100 INJECTION SUBCUTANEOUS at 12:58

## 2021-10-09 RX ADMIN — Medication 100 MILLIGRAM(S): at 12:59

## 2021-10-09 RX ADMIN — Medication 1 MILLIGRAM(S): at 12:59

## 2021-10-09 RX ADMIN — Medication 100 MILLIGRAM(S): at 05:41

## 2021-10-09 RX ADMIN — Medication 1 PATCH: at 19:30

## 2021-10-09 RX ADMIN — Medication 650 MILLIGRAM(S): at 21:46

## 2021-10-09 RX ADMIN — Medication 650 MILLIGRAM(S): at 21:16

## 2021-10-09 RX ADMIN — Medication 1 PATCH: at 12:57

## 2021-10-09 RX ADMIN — Medication 1 PATCH: at 12:55

## 2021-10-09 RX ADMIN — Medication 1 PATCH: at 07:20

## 2021-10-09 RX ADMIN — Medication 100 MILLIGRAM(S): at 12:58

## 2021-10-09 RX ADMIN — Medication 50 MILLIGRAM(S): at 21:16

## 2021-10-09 RX ADMIN — Medication 100 MILLIGRAM(S): at 21:16

## 2021-10-09 RX ADMIN — Medication 3 MILLIGRAM(S): at 00:02

## 2021-10-09 NOTE — BH CONSULTATION LIAISON PROGRESS NOTE - NSBHASSESSMENTFT_PSY_ALL_CORE
61yo single, disabled, female domiciled with sister with PMHx etoh dependence/abuse (sober 3 years-starting drinking 5 weeks ago-4 bottles Vodka daily), 2 detox admissions for etoh, hx visual hallucinations when drinking and or trying sobriety, PPHx depression, anxiety f/b outpatient provider, Dr. Youngblood, prescribed Klonopin, hx trauma, no hx legal issues, no hx inpatient psychiatric admission.     Chart reviewed. AAO x 3 (name, year, reason for admission). Patient is actively diaphoretic, coughing frequently. She answers directly to the questions being asked.  Patient scored a 4/4 on a CAGE screen. Patient admits that she has always been anxious for as long as she remembers. She explains that she does not currently have hallucinations, but reports past hallucinations seeing odd lights and people speaking in words that could not be understood. Admits to no current SI/HI. Thought content generally non-bizarre. Thought process linear. Attention is fair.     10/7--- does not engage much with md. Sister very concerned about si and untreated depression.   10/8-- minimizing alcohol use, minimizing mood or si concerns raised by family  10/9: Minimizing alcohol use or mood symptoms. Believes her partner is "lying" so that she can be in rehab. Denies SI/I/P, refusing rehab     PLAN:  -c/w 1:1 CO  -c/w thiamine as per primary team   - ContinueTrazodone 50mg q HS PO--- for insomnia  - Dispo: Inpatient psychiatric admission. 2pc is in chart   61yo single, disabled, female domiciled with sister with PMHx etoh dependence/abuse (sober 3 years-starting drinking 5 weeks ago-4 bottles Vodka daily), 2 detox admissions for etoh, hx visual hallucinations when drinking and or trying sobriety, PPHx depression, anxiety f/b outpatient provider, Dr. Youngblood, prescribed Klonopin, hx trauma, no hx legal issues, no hx inpatient psychiatric admission.     Chart reviewed. AAO x 3 (name, year, reason for admission). Patient is actively diaphoretic, coughing frequently. She answers directly to the questions being asked.  Patient scored a 4/4 on a CAGE screen. Patient admits that she has always been anxious for as long as she remembers. She explains that she does not currently have hallucinations, but reports past hallucinations seeing odd lights and people speaking in words that could not be understood. Admits to no current SI/HI. Thought content generally non-bizarre. Thought process linear. Attention is fair.     10/7--- does not engage much with md. Sister very concerned about si and untreated depression.   10/8-- minimizing alcohol use, minimizing mood or si concerns raised by family  10/9: Minimizing alcohol use or mood symptoms. Believes her partner is "lying" so that she can be in rehab. Denies SI/I/P, refusing rehab     PLAN:  -c/w 1:1 CO  -c/w thiamine as per primary team   - Continue Trazodone 50mg q HS PO--- for insomnia  - Dispo: Inpatient psychiatric admission. 2pc is in chart

## 2021-10-09 NOTE — BH CONSULTATION LIAISON PROGRESS NOTE - NSBHFUPINTERVALHXFT_PSY_A_CORE
Chart reviewed, no acute events overnight. Reports improved sleep with Trazodone.     Pt appears to be minimizing symptoms. Pt discussed complicated relationship with her partner Shirley who she is concerned is "saying things" so that she is in rehab. Requesting that primary team contact her sister. Pt denies feeling depressed or hopeless. Discussed trauma from 30 years ago. Denies SI/I/P, states that she "wants to live." Reports occasional "voices" that tell her to drink but denies any other psychotic symptoms. States that she relapse with alcohol due to stress of her relationship. She states she will "not" be forced into rehab.

## 2021-10-09 NOTE — PROGRESS NOTE ADULT - PROBLEM SELECTOR PLAN 4
WBC 14.04 with history if vomiting, afebrile, now resolved   Monitor off abx for now   If spikes temp would cover with Zosyn

## 2021-10-09 NOTE — PROGRESS NOTE ADULT - SUBJECTIVE AND OBJECTIVE BOX
Patient is a 60y old  Female who presents with a chief complaint of intoxication (08 Oct 2021 07:37)      SUBJECTIVE / OVERNIGHT EVENTS:    MEDICATIONS  (STANDING):  albuterol/ipratropium for Nebulization 3 milliLiter(s) Nebulizer every 6 hours  benzonatate 100 milliGRAM(s) Oral every 8 hours  enoxaparin Injectable 40 milliGRAM(s) SubCutaneous daily  folic acid 1 milliGRAM(s) Oral daily  influenza   Vaccine 0.5 milliLiter(s) IntraMuscular once  multivitamin 1 Tablet(s) Oral daily  nicotine -  14 mG/24Hr(s) Patch 1 patch Transdermal daily  senna 2 Tablet(s) Oral at bedtime  thiamine 100 milliGRAM(s) Oral daily  traZODone 50 milliGRAM(s) Oral at bedtime    MEDICATIONS  (PRN):  acetaminophen   Tablet .. 650 milliGRAM(s) Oral every 6 hours PRN Temp greater or equal to 38C (100.4F), Mild Pain (1 - 3)  aluminum hydroxide/magnesium hydroxide/simethicone Suspension 30 milliLiter(s) Oral every 4 hours PRN Dyspepsia  guaiFENesin Oral Liquid (Sugar-Free) 100 milliGRAM(s) Oral every 6 hours PRN Cough  LORazepam     Tablet 2 milliGRAM(s) Oral every 2 hours PRN Symptom-triggered 2 point increase in CIWA-Ar  LORazepam   Injectable 2 milliGRAM(s) IV Push every 1 hour PRN Symptom-triggered: each CIWA -Ar score 8 or GREATER  melatonin 3 milliGRAM(s) Oral at bedtime PRN Insomnia  nicotine  Polacrilex Gum 2 milliGRAM(s) Oral four times a day PRN cravings  ondansetron Injectable 4 milliGRAM(s) IV Push every 8 hours PRN Nausea and/or Vomiting      Vital Signs Last 24 Hrs  T(C): 36.7 (09 Oct 2021 05:38), Max: 36.7 (09 Oct 2021 05:38)  T(F): 98.1 (09 Oct 2021 05:38), Max: 98.1 (09 Oct 2021 05:38)  HR: 94 (09 Oct 2021 05:38) (93 - 98)  BP: 115/65 (09 Oct 2021 05:38) (109/65 - 118/63)  BP(mean): --  RR: 18 (09 Oct 2021 05:38) (18 - 20)  SpO2: 99% (09 Oct 2021 05:38) (96% - 99%)  CAPILLARY BLOOD GLUCOSE        I&O's Summary      PHYSICAL EXAM:  GENERAL: NAD, well-developed  HEAD:  Atraumatic, Normocephalic  EYES: EOMI, PERRLA, conjunctiva and sclera clear  NECK: Supple, No JVD  CHEST/LUNG: Clear to auscultation bilaterally; No wheeze  HEART: Regular rate and rhythm; No murmurs, rubs, or gallops  ABDOMEN: Soft, Nontender, Nondistended; Bowel sounds present  EXTREMITIES:  2+ Peripheral Pulses, No clubbing, cyanosis, or edema  PSYCH: AAOx3  NEUROLOGY: non-focal  SKIN: No rashes or lesions    LABS:                    RADIOLOGY & ADDITIONAL TESTS:    Imaging Personally Reviewed:    Consultant(s) Notes Reviewed:      Care Discussed with Consultants/Other Providers:   Patient is a 60y old  Female who presents with a chief complaint of intoxication (08 Oct 2021 07:37)      SUBJECTIVE / OVERNIGHT EVENTS: patient seen and examined by bedside, denies headache, dizziness, SOB, CP, Palpitations , N/V/D, abdominal pain  pt says she is not sleeping well,   pt says she does not want to go to rehab , says her partner is saying she is aggressive so that she is sent ot rehab but she wants to go home       MEDICATIONS  (STANDING):  albuterol/ipratropium for Nebulization 3 milliLiter(s) Nebulizer every 6 hours  benzonatate 100 milliGRAM(s) Oral every 8 hours  enoxaparin Injectable 40 milliGRAM(s) SubCutaneous daily  folic acid 1 milliGRAM(s) Oral daily  influenza   Vaccine 0.5 milliLiter(s) IntraMuscular once  multivitamin 1 Tablet(s) Oral daily  nicotine -  14 mG/24Hr(s) Patch 1 patch Transdermal daily  senna 2 Tablet(s) Oral at bedtime  thiamine 100 milliGRAM(s) Oral daily  traZODone 50 milliGRAM(s) Oral at bedtime    MEDICATIONS  (PRN):  acetaminophen   Tablet .. 650 milliGRAM(s) Oral every 6 hours PRN Temp greater or equal to 38C (100.4F), Mild Pain (1 - 3)  aluminum hydroxide/magnesium hydroxide/simethicone Suspension 30 milliLiter(s) Oral every 4 hours PRN Dyspepsia  guaiFENesin Oral Liquid (Sugar-Free) 100 milliGRAM(s) Oral every 6 hours PRN Cough  LORazepam     Tablet 2 milliGRAM(s) Oral every 2 hours PRN Symptom-triggered 2 point increase in CIWA-Ar  LORazepam   Injectable 2 milliGRAM(s) IV Push every 1 hour PRN Symptom-triggered: each CIWA -Ar score 8 or GREATER  melatonin 3 milliGRAM(s) Oral at bedtime PRN Insomnia  nicotine  Polacrilex Gum 2 milliGRAM(s) Oral four times a day PRN cravings  ondansetron Injectable 4 milliGRAM(s) IV Push every 8 hours PRN Nausea and/or Vomiting      Vital Signs Last 24 Hrs  T(C): 36.7 (09 Oct 2021 05:38), Max: 36.7 (09 Oct 2021 05:38)  T(F): 98.1 (09 Oct 2021 05:38), Max: 98.1 (09 Oct 2021 05:38)  HR: 94 (09 Oct 2021 05:38) (93 - 98)  BP: 115/65 (09 Oct 2021 05:38) (109/65 - 118/63)  BP(mean): --  RR: 18 (09 Oct 2021 05:38) (18 - 20)  SpO2: 99% (09 Oct 2021 05:38) (96% - 99%)  CAPILLARY BLOOD GLUCOSE        I&O's Summary      PHYSICAL EXAM:  CONSTITUTIONAL: NAD, well-developed,   EYES: PERRLA; conjunctiva and sclera clear  RESPIRATORY: B/L AE, no wheezing   CARDIOVASCULAR: Regular rate and rhythm, normal S1 and S2, no murmur/rub/gallop; No lower extremity edema  ABDOMEN: Nontender to palpation, normoactive bowel sounds, no rebound/guarding  MUSCULOSKELETAL:  No clubbing or cyanosis of digits; no joint swelling or tenderness to palpation  PSYCH: A+O to person, place, and time; affect appropriate  NEUROLOGY: CN 2-12 are intact and symmetric; no gross sensory deficits; +tremors with arms extended,   SKIN: No rashes; no palpable lesions      LABS:        no new labs             RADIOLOGY & ADDITIONAL TESTS:    Imaging Personally Reviewed:    Consultant(s) Notes Reviewed:  psychiatry     Care Discussed with Consultants/Other Providers:

## 2021-10-09 NOTE — PROGRESS NOTE ADULT - PROBLEM SELECTOR PLAN 7
DVT ppx: lovenox  dispo : pt medically cleared , plan for inpatient Psych admission at [facility], awaiting bed   plan of care psych, recommend inpatient psych as sister very concerned, pt danger to self,  2PC completed by Psych  case d/w  Psych bakari CHRISTIAN to [facility], pending bed availability

## 2021-10-10 PROCEDURE — 99232 SBSQ HOSP IP/OBS MODERATE 35: CPT

## 2021-10-10 RX ADMIN — SENNA PLUS 2 TABLET(S): 8.6 TABLET ORAL at 22:14

## 2021-10-10 RX ADMIN — Medication 1 PATCH: at 11:54

## 2021-10-10 RX ADMIN — Medication 1 PATCH: at 19:24

## 2021-10-10 RX ADMIN — Medication 1 PATCH: at 11:49

## 2021-10-10 RX ADMIN — Medication 1 MILLIGRAM(S): at 11:49

## 2021-10-10 RX ADMIN — Medication 100 MILLIGRAM(S): at 05:31

## 2021-10-10 RX ADMIN — Medication 100 MILLIGRAM(S): at 11:48

## 2021-10-10 RX ADMIN — ENOXAPARIN SODIUM 40 MILLIGRAM(S): 100 INJECTION SUBCUTANEOUS at 11:49

## 2021-10-10 RX ADMIN — Medication 3 MILLILITER(S): at 09:52

## 2021-10-10 RX ADMIN — Medication 50 MILLIGRAM(S): at 22:13

## 2021-10-10 RX ADMIN — Medication 100 MILLIGRAM(S): at 22:13

## 2021-10-10 RX ADMIN — Medication 1 TABLET(S): at 11:48

## 2021-10-10 NOTE — PROGRESS NOTE ADULT - SUBJECTIVE AND OBJECTIVE BOX
Patient is a 60y old  Female who presents with a chief complaint of intoxication (09 Oct 2021 07:30)      SUBJECTIVE / OVERNIGHT EVENTS:    MEDICATIONS  (STANDING):  albuterol/ipratropium for Nebulization 3 milliLiter(s) Nebulizer every 6 hours  benzonatate 100 milliGRAM(s) Oral every 8 hours  enoxaparin Injectable 40 milliGRAM(s) SubCutaneous daily  folic acid 1 milliGRAM(s) Oral daily  influenza   Vaccine 0.5 milliLiter(s) IntraMuscular once  multivitamin 1 Tablet(s) Oral daily  nicotine -  14 mG/24Hr(s) Patch 1 patch Transdermal daily  senna 2 Tablet(s) Oral at bedtime  thiamine 100 milliGRAM(s) Oral daily  traZODone 50 milliGRAM(s) Oral at bedtime    MEDICATIONS  (PRN):  acetaminophen   Tablet .. 650 milliGRAM(s) Oral every 6 hours PRN Temp greater or equal to 38C (100.4F), Mild Pain (1 - 3)  aluminum hydroxide/magnesium hydroxide/simethicone Suspension 30 milliLiter(s) Oral every 4 hours PRN Dyspepsia  guaiFENesin Oral Liquid (Sugar-Free) 100 milliGRAM(s) Oral every 6 hours PRN Cough  LORazepam     Tablet 2 milliGRAM(s) Oral every 2 hours PRN Symptom-triggered 2 point increase in CIWA-Ar  LORazepam   Injectable 2 milliGRAM(s) IV Push every 1 hour PRN Symptom-triggered: each CIWA -Ar score 8 or GREATER  melatonin 3 milliGRAM(s) Oral at bedtime PRN Insomnia  nicotine  Polacrilex Gum 2 milliGRAM(s) Oral four times a day PRN cravings  ondansetron Injectable 4 milliGRAM(s) IV Push every 8 hours PRN Nausea and/or Vomiting      Vital Signs Last 24 Hrs  T(C): 37.6 (09 Oct 2021 20:35), Max: 37.6 (09 Oct 2021 20:35)  T(F): 99.7 (09 Oct 2021 20:35), Max: 99.7 (09 Oct 2021 20:35)  HR: 92 (09 Oct 2021 20:35) (90 - 100)  BP: 116/67 (09 Oct 2021 20:35) (116/67 - 136/88)  BP(mean): --  RR: 18 (09 Oct 2021 20:35) (18 - 18)  SpO2: 96% (09 Oct 2021 20:35) (96% - 99%)  CAPILLARY BLOOD GLUCOSE        I&O's Summary      PHYSICAL EXAM:  GENERAL: NAD, well-developed  HEAD:  Atraumatic, Normocephalic  EYES: EOMI, PERRLA, conjunctiva and sclera clear  NECK: Supple, No JVD  CHEST/LUNG: Clear to auscultation bilaterally; No wheeze  HEART: Regular rate and rhythm; No murmurs, rubs, or gallops  ABDOMEN: Soft, Nontender, Nondistended; Bowel sounds present  EXTREMITIES:  2+ Peripheral Pulses, No clubbing, cyanosis, or edema  PSYCH: AAOx3  NEUROLOGY: non-focal  SKIN: No rashes or lesions    LABS:                    RADIOLOGY & ADDITIONAL TESTS:    Imaging Personally Reviewed:    Consultant(s) Notes Reviewed:      Care Discussed with Consultants/Other Providers:   Patient is a 60y old  Female who presents with a chief complaint of intoxication (09 Oct 2021 07:30)      SUBJECTIVE / OVERNIGHT EVENTS: patient seen and examined by bedside, cough improved, denies headache, dizziness, SOB, CP, Palpitations , N/V/D, abdominal pain        MEDICATIONS  (STANDING):  albuterol/ipratropium for Nebulization 3 milliLiter(s) Nebulizer every 6 hours  benzonatate 100 milliGRAM(s) Oral every 8 hours  enoxaparin Injectable 40 milliGRAM(s) SubCutaneous daily  folic acid 1 milliGRAM(s) Oral daily  influenza   Vaccine 0.5 milliLiter(s) IntraMuscular once  multivitamin 1 Tablet(s) Oral daily  nicotine -  14 mG/24Hr(s) Patch 1 patch Transdermal daily  senna 2 Tablet(s) Oral at bedtime  thiamine 100 milliGRAM(s) Oral daily  traZODone 50 milliGRAM(s) Oral at bedtime    MEDICATIONS  (PRN):  acetaminophen   Tablet .. 650 milliGRAM(s) Oral every 6 hours PRN Temp greater or equal to 38C (100.4F), Mild Pain (1 - 3)  aluminum hydroxide/magnesium hydroxide/simethicone Suspension 30 milliLiter(s) Oral every 4 hours PRN Dyspepsia  guaiFENesin Oral Liquid (Sugar-Free) 100 milliGRAM(s) Oral every 6 hours PRN Cough  LORazepam     Tablet 2 milliGRAM(s) Oral every 2 hours PRN Symptom-triggered 2 point increase in CIWA-Ar  LORazepam   Injectable 2 milliGRAM(s) IV Push every 1 hour PRN Symptom-triggered: each CIWA -Ar score 8 or GREATER  melatonin 3 milliGRAM(s) Oral at bedtime PRN Insomnia  nicotine  Polacrilex Gum 2 milliGRAM(s) Oral four times a day PRN cravings  ondansetron Injectable 4 milliGRAM(s) IV Push every 8 hours PRN Nausea and/or Vomiting      Vital Signs Last 24 Hrs  T(C): 37.6 (09 Oct 2021 20:35), Max: 37.6 (09 Oct 2021 20:35)  T(F): 99.7 (09 Oct 2021 20:35), Max: 99.7 (09 Oct 2021 20:35)  HR: 92 (09 Oct 2021 20:35) (90 - 100)  BP: 116/67 (09 Oct 2021 20:35) (116/67 - 136/88)  BP(mean): --  RR: 18 (09 Oct 2021 20:35) (18 - 18)  SpO2: 96% (09 Oct 2021 20:35) (96% - 99%)  CAPILLARY BLOOD GLUCOSE        I&O's Summary        PHYSICAL EXAM:  CONSTITUTIONAL: NAD, well-developed,   EYES: PERRLA; conjunctiva and sclera clear  RESPIRATORY: B/L AE, no wheezing   CARDIOVASCULAR: Regular rate and rhythm, normal S1 and S2, no murmur/rub/gallop; No lower extremity edema  ABDOMEN: Nontender to palpation, normoactive bowel sounds, no rebound/guarding  MUSCULOSKELETAL:  No clubbing or cyanosis of digits; no joint swelling or tenderness to palpation  PSYCH: A+O to person, place, and time; affect appropriate  NEUROLOGY: CN 2-12 are intact and symmetric; no gross sensory deficits; +tremors with arms extended,   SKIN: No rashes; no palpable lesions      LABS:                    RADIOLOGY & ADDITIONAL TESTS:    Imaging Personally Reviewed:    Consultant(s) Notes Reviewed:      Care Discussed with Consultants/Other Providers:

## 2021-10-10 NOTE — PROGRESS NOTE ADULT - PROBLEM SELECTOR PLAN 7
DVT ppx: lovenox  dispo : pt medically cleared , plan for inpatient Psych admission at [facility], awaiting bed   plan of care psych, recommend inpatient psych as sister very concerned, pt danger to self,  2PC completed by Psych  case d/w  Psych bakari CHRISTIAN to [facility], pending bed availability

## 2021-10-10 NOTE — PROGRESS NOTE ADULT - PROBLEM SELECTOR PLAN 4
WBC 14.04 with history if vomiting, afebrile, now resolved   Monitor off abx for now   If spikes temp would cover with Zosyn WBC 14.04 with history if vomiting, afebrile, now resolved   Monitor off abx for now

## 2021-10-11 PROCEDURE — 99232 SBSQ HOSP IP/OBS MODERATE 35: CPT

## 2021-10-11 PROCEDURE — 99233 SBSQ HOSP IP/OBS HIGH 50: CPT

## 2021-10-11 RX ADMIN — Medication 1 TABLET(S): at 14:05

## 2021-10-11 RX ADMIN — Medication 3 MILLIGRAM(S): at 22:42

## 2021-10-11 RX ADMIN — ENOXAPARIN SODIUM 40 MILLIGRAM(S): 100 INJECTION SUBCUTANEOUS at 14:05

## 2021-10-11 RX ADMIN — Medication 1 PATCH: at 14:04

## 2021-10-11 RX ADMIN — Medication 1 MILLIGRAM(S): at 14:05

## 2021-10-11 RX ADMIN — Medication 100 MILLIGRAM(S): at 14:06

## 2021-10-11 NOTE — DIETITIAN INITIAL EVALUATION ADULT. - PERTINENT MEDS FT
MEDICATIONS  (STANDING):  benzonatate 100 milliGRAM(s) Oral every 8 hours  enoxaparin Injectable 40 milliGRAM(s) SubCutaneous daily  folic acid 1 milliGRAM(s) Oral daily  influenza   Vaccine 0.5 milliLiter(s) IntraMuscular once  multivitamin 1 Tablet(s) Oral daily  nicotine -  14 mG/24Hr(s) Patch 1 patch Transdermal daily  senna 2 Tablet(s) Oral at bedtime  thiamine 100 milliGRAM(s) Oral daily  traZODone 50 milliGRAM(s) Oral at bedtime    MEDICATIONS  (PRN):  acetaminophen   Tablet .. 650 milliGRAM(s) Oral every 6 hours PRN Temp greater or equal to 38C (100.4F), Mild Pain (1 - 3)  guaiFENesin Oral Liquid (Sugar-Free) 100 milliGRAM(s) Oral every 6 hours PRN Cough  melatonin 3 milliGRAM(s) Oral at bedtime PRN Insomnia  nicotine  Polacrilex Gum 2 milliGRAM(s) Oral four times a day PRN cravings

## 2021-10-11 NOTE — BH CONSULTATION LIAISON PROGRESS NOTE - NSBHASSESSMENTFT_PSY_ALL_CORE
59 y/o single, disabled, woman domiciled with sister with AUD, severe (sober 3 years-starting drinking 5 weeks ago-4 bottles Vodka daily), 2 detox admissions for etoh, hx visual hallucinations when drinking and or trying sobriety, PPHx depression, anxiety f/b outpatient provider, Dr. Youngblood, prescribed Klonopin, hx trauma, no hx legal issues, no hx inpatient psychiatric admission who presented for alcohol w/d treatment in setting of collateral that pt has been acutely suicidal. Psychiatry is consulted to assessment of suicidality and need for psychiatric inpatient admission.     Current presentation is suggestive of AUD, severe in the setting of collateral endorsed severe depression and SI. At current, patient maintains that she is not suicidal, has not been suicidal and does not warrant inpatient psychiatric admission. Patient continues to minimize details of current HPI and need for psychiatric care for mood sx and for substance use disorder. At current, we have robust collateral from Shirley that pt has been recently suicidal, including making suicidal gestures (drinking perfume) and endorsing SI. Given this robust and concerning collateral and pt with minimization of all serious and significant sx, it is my opinion that the current clinical situation is such that patient is conceptualized as an imminent risk of harm to self, based on robust collateral, and as such, requires 2PC involuntary psychiatric admission.     As such, plan is for pt to be transferred to inpatient psychiatry once medically cleared and when next inpatient bed available.     Rest of plan below:    PLAN:  -c/w 1:1 CO- for concern for harm to self  -c/w Trazodone 50mg q HS PO--- for insomnia    - Dispo: Involuntary Inpatient psychiatric admission. 2pc is in chart

## 2021-10-11 NOTE — PROGRESS NOTE ADULT - PROBLEM SELECTOR PLAN 1
ETOH level high on admission  Exam findings on admission  suggested  withdrawal, tremors, anxiety, diaphoresis  s/p CIWA monitoring    s/p  Ativan taper with symptom triggered Ativan  c/w Thiamine, MVi and folic acid  sister Shirley concerned  for drug misuse and suicide attempt (Tylenol + ASA + Klonopin + Melatonin) + ETOH, also expresses that she has been ingesting mouthwash.  There is no ISTOP record of Klonopin under the names Juliette Guevara, Juliette Whitaker, Juliette Escalera  Pt has attempted to ETOH rehab in past, but relapsed quickly

## 2021-10-11 NOTE — BH CONSULTATION LIAISON PROGRESS NOTE - NSICDXBHSECONDARYDX_PSY_ALL_CORE
Substance induced mood disorder   F19.96  
Substance induced mood disorder   F19.11  
Substance induced mood disorder   F19.06  
Substance induced mood disorder   F19.24  
Substance induced mood disorder   F19.09

## 2021-10-11 NOTE — BH CONSULTATION LIAISON PROGRESS NOTE - MSE OPTIONS
Unstructured MSE
Structured MSE
Unstructured MSE
Structured MSE
Structured MSE

## 2021-10-11 NOTE — DIETITIAN INITIAL EVALUATION ADULT. - ORAL INTAKE PTA/DIET HISTORY
Patient reports poor PO intake x1 month PTA. Per chart, patient had started drinking again ~5 weeks ago, after 3 years of sobriety. Confirms NKFA.

## 2021-10-11 NOTE — BH CONSULTATION LIAISON PROGRESS NOTE - NSICDXBHPRIMARYDX_PSY_ALL_CORE
Alcohol dependence with withdrawal   F10.239  

## 2021-10-11 NOTE — BH CONSULTATION LIAISON PROGRESS NOTE - GENERAL APPEARANCE
No deformities present
Unable to assess
No deformities present

## 2021-10-11 NOTE — PROGRESS NOTE ADULT - PROBLEM SELECTOR PLAN 3
Pt likely with underlying emphysema/COPD given longstanding tobacco use  CXR with clear lungs  On Duonebs q6hrs alondra for now  Also trial Lasix 20mg IV x1 as pt was on standing fluids Pt likely with underlying emphysema/COPD given longstanding tobacco use  CXR with clear lungs  On Duonebs q6hrs   s/p  Lasix 20mg IV x1 as pt was on standing fluids  clinically improving -satting 93-97% on RA

## 2021-10-11 NOTE — PROGRESS NOTE ADULT - PROBLEM SELECTOR PLAN 5
Likely in setting of ETOH abuse, pt with some abdominal distention  RUQ sonogram noted, shows Diffusely increased echogenicity of the liver parenchyma, likely secondary to hepatic steatosis.  will continue to monitor

## 2021-10-11 NOTE — PROGRESS NOTE ADULT - SUBJECTIVE AND OBJECTIVE BOX
Wade Marshall  Hospitalist  Pager- [phone]    Patient is a 60y old  Female who presents with a chief complaint of intoxication (09 Oct 2021 07:30)      SUBJECTIVE / OVERNIGHT EVENTS: patient seen and examined by bedside, cough improved, denies headache, dizziness, SOB, CP, Palpitations , N/V/D, abdominal pain        MEDICATIONS  (STANDING):  benzonatate 100 milliGRAM(s) Oral every 8 hours  enoxaparin Injectable 40 milliGRAM(s) SubCutaneous daily  folic acid 1 milliGRAM(s) Oral daily  influenza   Vaccine 0.5 milliLiter(s) IntraMuscular once  multivitamin 1 Tablet(s) Oral daily  nicotine -  14 mG/24Hr(s) Patch 1 patch Transdermal daily  senna 2 Tablet(s) Oral at bedtime  thiamine 100 milliGRAM(s) Oral daily  traZODone 50 milliGRAM(s) Oral at bedtime    MEDICATIONS  (PRN):  acetaminophen   Tablet .. 650 milliGRAM(s) Oral every 6 hours PRN Temp greater or equal to 38C (100.4F), Mild Pain (1 - 3)  guaiFENesin Oral Liquid (Sugar-Free) 100 milliGRAM(s) Oral every 6 hours PRN Cough  melatonin 3 milliGRAM(s) Oral at bedtime PRN Insomnia  nicotine  Polacrilex Gum 2 milliGRAM(s) Oral four times a day PRN cravings    Vital Signs Last 24 Hrs  T(C): 37.2 (11 Oct 2021 05:55), Max: 37.2 (10 Oct 2021 12:47)  T(F): 98.9 (11 Oct 2021 05:55), Max: 98.9 (10 Oct 2021 12:47)  HR: 96 (11 Oct 2021 05:55) (87 - 96)  BP: 112/64 (11 Oct 2021 05:55) (99/48 - 112/64)  BP(mean): --  RR: 18 (11 Oct 2021 05:55) (18 - 18)  SpO2: 93% (11 Oct 2021 05:55) (93% - 97%)      CAPILLARY BLOOD GLUCOSE    I&O's Summary        PHYSICAL EXAM:  CONSTITUTIONAL: NAD, well-developed,   EYES: PERRLA; conjunctiva and sclera clear  RESPIRATORY: B/L AE, no wheezing   CARDIOVASCULAR: Regular rate and rhythm, normal S1 and S2, no murmur/rub/gallop; No lower extremity edema  ABDOMEN: Nontender to palpation, normoactive bowel sounds, no rebound/guarding  MUSCULOSKELETAL:  No clubbing or cyanosis of digits; no joint swelling or tenderness to palpation  PSYCH: A+O to person, place, and time; affect appropriate  NEUROLOGY: CN 2-12 are intact and symmetric; no gross sensory deficits; +tremors with arms extended,   SKIN: No rashes; no palpable lesions      LABS:                    RADIOLOGY & ADDITIONAL TESTS:    Imaging Personally Reviewed:    Consultant(s) Notes Reviewed:      Care Discussed with Consultants/Other Providers:   Wade Marshall  Hospitalist  Pager- [phone]    Patient is a 60y old  Female who presents with a chief complaint of intoxication (09 Oct 2021 07:30)      SUBJECTIVE / OVERNIGHT EVENTS: patient seen and examined by bedside  cough improved  Denies headache, dizziness, SOB, CP, Palpitations , N/V/D, abdominal pain    MEDICATIONS  (STANDING):  benzonatate 100 milliGRAM(s) Oral every 8 hours  enoxaparin Injectable 40 milliGRAM(s) SubCutaneous daily  folic acid 1 milliGRAM(s) Oral daily  influenza   Vaccine 0.5 milliLiter(s) IntraMuscular once  multivitamin 1 Tablet(s) Oral daily  nicotine -  14 mG/24Hr(s) Patch 1 patch Transdermal daily  senna 2 Tablet(s) Oral at bedtime  thiamine 100 milliGRAM(s) Oral daily  traZODone 50 milliGRAM(s) Oral at bedtime    MEDICATIONS  (PRN):  acetaminophen   Tablet .. 650 milliGRAM(s) Oral every 6 hours PRN Temp greater or equal to 38C (100.4F), Mild Pain (1 - 3)  guaiFENesin Oral Liquid (Sugar-Free) 100 milliGRAM(s) Oral every 6 hours PRN Cough  melatonin 3 milliGRAM(s) Oral at bedtime PRN Insomnia  nicotine  Polacrilex Gum 2 milliGRAM(s) Oral four times a day PRN cravings    Vital Signs Last 24 Hrs  T(C): 37.2 (11 Oct 2021 05:55), Max: 37.2 (10 Oct 2021 12:47)  T(F): 98.9 (11 Oct 2021 05:55), Max: 98.9 (10 Oct 2021 12:47)  HR: 96 (11 Oct 2021 05:55) (87 - 96)  BP: 112/64 (11 Oct 2021 05:55) (99/48 - 112/64)  BP(mean): --  RR: 18 (11 Oct 2021 05:55) (18 - 18)  SpO2: 93% (11 Oct 2021 05:55) (93% - 97%)      CAPILLARY BLOOD GLUCOSE    I&O's Summary        PHYSICAL EXAM:  CONSTITUTIONAL: NAD, well-developed,   EYES: PERRLA; conjunctiva and sclera clear  RESPIRATORY: B/L AE, no wheezing   CARDIOVASCULAR: Regular rate and rhythm, normal S1 and S2, no murmur/rub/gallop; No lower extremity edema  ABDOMEN: Nontender to palpation, normoactive bowel sounds, no rebound/guarding  MUSCULOSKELETAL:  No clubbing or cyanosis of digits; no joint swelling or tenderness to palpation  PSYCH: A+O to person, place, and time; affect appropriate  NEUROLOGY: CN 2-12 are intact and symmetric; no gross sensory deficits; +tremors with arms extended,   SKIN: No rashes; no palpable lesions      LABS:                    RADIOLOGY & ADDITIONAL TESTS:    Imaging Personally Reviewed:    Consultant(s) Notes Reviewed:      Care Discussed with Consultants/Other Providers:

## 2021-10-11 NOTE — BH CONSULTATION LIAISON PROGRESS NOTE - NSBHCHARTREVIEWLAB_PSY_A_CORE FT
CBC Full  -  ( 01 Oct 2021 21:14 )  WBC Count : 14.07 K/uL  RBC Count : 4.82 M/uL  Hemoglobin : 16.2 g/dL  Hematocrit : 44.2 %  Platelet Count - Automated : 321 K/uL  Mean Cell Volume : 91.7 fL  Mean Cell Hemoglobin : 33.6 pg  Mean Cell Hemoglobin Concentration : 36.7 gm/dL  Auto Neutrophil # : 11.24 K/uL  Auto Lymphocyte # : 1.30 K/uL  Auto Monocyte # : 1.28 K/uL  Auto Eosinophil # : 0.01 K/uL  Auto Basophil # : 0.09 K/uL  Auto Neutrophil % : 79.9 %  Auto Lymphocyte % : 9.2 %  Auto Monocyte % : 9.1 %  Auto Eosinophil % : 0.1 %  Auto Basophil % : 0.6 %  10-01    139  |  95<L>  |  9   ----------------------------<  73  3.6   |  22  |  0.51    Ca    7.5<L>      01 Oct 2021 21:14  Phos  4.1     10-01  Mg     2.10     10-01    TPro  5.6<L>  /  Alb  3.4  /  TBili  0.8  /  DBili  x   /  AST  103<H>  /  ALT  65<H>  /  AlkPhos  92  10-01  Alcohol, Blood: 290: <10 mg/dL = Negative mg/dL (10.01.21 @ 21:14)
CBC Full  -  ( 07 Oct 2021 07:24 )  WBC Count : 9.22 K/uL  RBC Count : 4.08 M/uL  Hemoglobin : 13.3 g/dL  Hematocrit : 39.7 %  Platelet Count - Automated : 215 K/uL  Mean Cell Volume : 97.3 fL  Mean Cell Hemoglobin : 32.6 pg  Mean Cell Hemoglobin Concentration : 33.5 gm/dL  Auto Neutrophil # : x  Auto Lymphocyte # : x  Auto Monocyte # : x  Auto Eosinophil # : x  Auto Basophil # : x  Auto Neutrophil % : x  Auto Lymphocyte % : x  Auto Monocyte % : x  Auto Eosinophil % : x  Auto Basophil % : x  10-07    135  |  99  |  6<L>  ----------------------------<  116<H>  4.3   |  24  |  0.50    Ca    8.8      07 Oct 2021 07:24  Phos  4.9     10-07  Mg     1.80     10-07    TPro  5.8<L>  /  Alb  2.9<L>  /  TBili  0.7  /  DBili  x   /  AST  68<H>  /  ALT  60<H>  /  AlkPhos  86  10-07  
CBC Full  -  ( 05 Oct 2021 06:58 )  WBC Count : 11.24 K/uL  RBC Count : 4.23 M/uL  Hemoglobin : 14.0 g/dL  Hematocrit : 40.8 %  Platelet Count - Automated : 197 K/uL  Mean Cell Volume : 96.5 fL  Mean Cell Hemoglobin : 33.1 pg  Mean Cell Hemoglobin Concentration : 34.3 gm/dL  Auto Neutrophil # : x  Auto Lymphocyte # : x  Auto Monocyte # : x  Auto Eosinophil # : x  Auto Basophil # : x  Auto Neutrophil % : x  Auto Lymphocyte % : x  Auto Monocyte % : x  Auto Eosinophil % : x  Auto Basophil % : x  10-05    138  |  101  |  5<L>  ----------------------------<  161<H>  3.9   |  26  |  0.46<L>    Ca    8.2<L>      05 Oct 2021 06:58  Phos  2.5     10-05  Mg     1.70     10-05    TPro  5.5<L>  /  Alb  2.9<L>  /  TBili  0.6  /  DBili  x   /  AST  34<H>  /  ALT  34<H>  /  AlkPhos  83  10-05

## 2021-10-11 NOTE — DIETITIAN INITIAL EVALUATION ADULT. - OTHER INFO
Patient reports good appetite with 100% PO intake in-house. Denies GI distress (nausea/vomiting/diarrhea/constipation), endorses regular BM's as she is taking senna. States no difficulty chewing/swallowing. Endorses a stable usual body weight of 68.2kg. Admit weight of 71.9kg differs from stated weight possibly in setting of scale error.

## 2021-10-11 NOTE — BH CONSULTATION LIAISON PROGRESS NOTE - NSBHCHARTREVIEWVS_PSY_A_CORE FT
Vital Signs Last 24 Hrs  T(C): 36.8 (05 Oct 2021 18:00), Max: 37.1 (05 Oct 2021 09:00)  T(F): 98.2 (05 Oct 2021 18:00), Max: 98.8 (05 Oct 2021 09:00)  HR: 118 (05 Oct 2021 18:00) (80 - 128)  BP: 108/57 (05 Oct 2021 18:00) (108/57 - 127/68)  BP(mean): --  RR: 17 (05 Oct 2021 18:00) (17 - 18)  SpO2: 95% (05 Oct 2021 18:00) (95% - 100%)
Vital Signs Last 24 Hrs  T(C): 36.7 (06 Oct 2021 05:15), Max: 37 (05 Oct 2021 22:00)  T(F): 98.1 (06 Oct 2021 05:15), Max: 98.6 (05 Oct 2021 22:00)  HR: 86 (06 Oct 2021 10:40) (86 - 128)  BP: 106/59 (06 Oct 2021 05:15) (106/59 - 119/62)  BP(mean): --  RR: 17 (06 Oct 2021 05:15) (17 - 17)  SpO2: 98% (06 Oct 2021 10:40) (95% - 100%)
Vital Signs Last 24 Hrs  T(C): 36.8 (09 Oct 2021 13:36), Max: 36.8 (09 Oct 2021 13:36)  T(F): 98.3 (09 Oct 2021 13:36), Max: 98.3 (09 Oct 2021 13:36)  HR: 100 (09 Oct 2021 13:36) (90 - 100)  BP: 136/88 (09 Oct 2021 13:36) (109/65 - 136/88)  BP(mean): --  RR: 18 (09 Oct 2021 13:36) (18 - 20)  SpO2: 99% (09 Oct 2021 13:36) (96% - 99%)
Vital Signs Last 24 Hrs  T(C): 37.2 (11 Oct 2021 05:55), Max: 37.2 (11 Oct 2021 05:55)  T(F): 98.9 (11 Oct 2021 05:55), Max: 98.9 (11 Oct 2021 05:55)  HR: 96 (11 Oct 2021 05:55) (94 - 96)  BP: 112/64 (11 Oct 2021 05:55) (103/69 - 112/64)  BP(mean): --  RR: 18 (11 Oct 2021 05:55) (18 - 18)  SpO2: 93% (11 Oct 2021 05:55) (93% - 97%)
Vital Signs Last 24 Hrs  T(C): 37.1 (07 Oct 2021 11:24), Max: 37.1 (07 Oct 2021 05:52)  T(F): 98.8 (07 Oct 2021 11:24), Max: 98.8 (07 Oct 2021 11:24)  HR: 106 (07 Oct 2021 11:24) (82 - 106)  BP: 131/74 (07 Oct 2021 11:24) (109/58 - 131/74)  BP(mean): --  RR: 18 (07 Oct 2021 11:24) (18 - 18)  SpO2: 97% (07 Oct 2021 11:24) (94% - 98%)
Vital Signs Last 24 Hrs  T(C): 37.2 (04 Oct 2021 14:37), Max: 37.7 (03 Oct 2021 16:56)  T(F): 98.9 (04 Oct 2021 14:37), Max: 99.8 (03 Oct 2021 16:56)  HR: 82 (04 Oct 2021 14:37) (82 - 119)  BP: 112/73 (04 Oct 2021 14:37) (112/73 - 142/82)  BP(mean): --  RR: 18 (04 Oct 2021 14:37) (18 - 19)  SpO2: 97% (04 Oct 2021 14:37) (92% - 98%)
Vital Signs Last 24 Hrs  T(C): 37.3 (08 Oct 2021 05:12), Max: 37.3 (08 Oct 2021 05:12)  T(F): 99.1 (08 Oct 2021 05:12), Max: 99.1 (08 Oct 2021 05:12)  HR: 92 (08 Oct 2021 05:12) (92 - 95)  BP: 104/47 (08 Oct 2021 05:12) (104/47 - 110/77)  BP(mean): --  RR: 18 (08 Oct 2021 05:12) (17 - 18)  SpO2: 94% (08 Oct 2021 05:12) (94% - 99%)

## 2021-10-11 NOTE — PROGRESS NOTE ADULT - PROBLEM SELECTOR PLAN 2
Patient endorsed "some audiovisual hallucination in the past, with bright lights and people crowded around her and speaking in voices that she does not understand."  Currently not endorsing hallucinations however not engaging much in conversation  Psych following for ongoing evaluation,, psych recommending inpatient psych after medically cleared Patient endorsed "some audiovisual hallucination in the past, with bright lights and people crowded around her and speaking in voices that she does not understand."  Currently not endorsing hallucinations however not engaging much in conversation  Psych following for ongoing evaluation,, psych recommending inpatient psych after medically cleared  Fu psych recs re discharge planning

## 2021-10-11 NOTE — PROGRESS NOTE ADULT - PROBLEM SELECTOR PLAN 7
DVT ppx: lovenox  dispo : pt medically cleared , plan for inpatient Psych admission at [facility], awaiting bed   plan of care psych, recommend inpatient psych as sister very concerned, pt danger to self,  2PC completed by Psych  case d/w  Psych bakari CHRISTIAN to [facility], pending bed availability DVT ppx: lovenox  dispo : pt medically cleared , plan for inpatient Psych admission at [facility], awaiting bed   plan of care psych, recommend inpatient psych as sister very concerned, pt danger to self,  2PC completed by Psych  case d/w  Psych SW, dc to [facility], pending bed availability  Dc planning 33mins

## 2021-10-11 NOTE — BH CONSULTATION LIAISON PROGRESS NOTE - NSBHMSEAFFQUAL_PSY_A_CORE
CC: uncontrolled DM2 with hyperglycemia     Seen with Lao Video   DM2 diagnosed in 1999     Insulin was started August 2019  Was hospitalized for sepsis at [facility] with [facility], found to have acidosis and required dialysis; metformin was stopped due to lactic acidosis  Creatinine has now improved and GFR is at 75    10/16/2019: Glimepiride was stopped and he was started on Trulicity.  Basaglar 16 units subcut qDaily.  He did not bring glucose readings.  He is not checking regularly  AM glucoses are low to mid 200's.  He has not yet seen RD.  He reports cramping in RLE. Still feels weak.  Denies GI upset from Trulicity, only mild headache.    Past Medical History:   Diagnosis Date   • Cataract    • Diabetes mellitus (CMS/HCC)    • Diabetic peripheral neuropathy (CMS/HCC)    • Erectile disorder due to medical condition in male      Past Surgical History:   Procedure Laterality Date   • Colonoscopy  01/09/2017     Social History     Socioeconomic History   • Marital status: /Civil Union     Spouse name: Not on file   • Number of children: Not on file   • Years of education: Not on file   • Highest education level: Not on file   Occupational History   • Not on file   Social Needs   • Financial resource strain: Not on file   • Food insecurity:     Worry: Not on file     Inability: Not on file   • Transportation needs:     Medical: Not on file     Non-medical: Not on file   Tobacco Use   • Smoking status: Never Smoker   • Smokeless tobacco: Never Used   Substance and Sexual Activity   • Alcohol use: Never     Frequency: Never   • Drug use: Not Currently   • Sexual activity: Yes     Partners: Female   Lifestyle   • Physical activity:     Days per week: Not on file     Minutes per session: Not on file   • Stress: Not on file   Relationships   • Social connections:     Talks on phone: Not on file     Gets together: Not on file     Attends [employer] service: Not on file     Active member of club or 
organization: Not on file     Attends meetings of clubs or organizations: Not on file     Relationship status: Not on file   • Intimate partner violence:     Fear of current or ex partner: Not on file     Emotionally abused: Not on file     Physically abused: Not on file     Forced sexual activity: Not on file   Other Topics Concern   • Not on file   Social History Narrative   • Not on file     Review of Systems   Constitutional: Negative.    HENT: Negative.    Eyes: Negative.    Respiratory: Negative.    Endocrine: Negative.    Musculoskeletal:         Cramping of RLE   Neurological: Positive for weakness.       ALLERGIES:  No Known Allergies   Visit Vitals  /78 (BP Location: RUE - Right upper extremity, Patient Position: Sitting, Cuff Size: Regular)   Pulse 83   Temp 97.2 °F (36.2 °C) (Temporal)   Wt 68 kg (150 lb)   SpO2 95%   BMI 24.21 kg/m²     Physical Exam   Constitutional: He is oriented to person, place, and time and well-developed, well-nourished, and in no distress. No distress.   HENT:   Head: Normocephalic and atraumatic.   Right Ear: External ear normal.   Left Ear: External ear normal.   Eyes: Right eye exhibits no discharge. No scleral icterus.   Cardiovascular: Normal rate.   Pulmonary/Chest: Effort normal and breath sounds normal. No respiratory distress. He has no wheezes.   Neurological: He is alert and oriented to person, place, and time. No cranial nerve deficit. Gait normal. GCS score is 15.   Skin: Skin is warm and dry. No rash noted. He is not diaphoretic. No erythema. No pallor.   Psychiatric: Mood, memory, affect and judgment normal.   Vitals reviewed.    TSH (mcUnits/mL)   Date Value   09/17/2019 2.804     Hemoglobin A1C (%)   Date Value   09/17/2019 7.8 (H)     MICROALBUMIN/CREATININE (mg/g)   Date Value   06/10/2019 21.0     A/P: 55 y/o M with uncontrolled DM2 previously with prolonged ICU hospitalization for sepsis. Not checking glucoses regularly.    Continue Trulicity 0.75 mg 
subcut every 7 days  Increase Basaglar to 20 units subcut qDaily.  Discussed the importance of checking glucoses TID: in AM, before dinner and at bedtime. Reviewed target glucoses  Pt will see RD to review diet  He will notify me in 2 weeks of glucose readings so that I can adjust insulin dose if needed    Follow-up with Dr. Ortega in 2020    
Euthymic
Depressed

## 2021-10-11 NOTE — BH CONSULTATION LIAISON PROGRESS NOTE - NSBHPTASSESSDT_PSY_A_CORE
07-Oct-2021 16:11
11-Oct-2021 13:34
06-Oct-2021 11:21
05-Oct-2021 21:52
08-Oct-2021 14:25
09-Oct-2021 14:32
04-Oct-2021 16:31

## 2021-10-11 NOTE — DIETITIAN INITIAL EVALUATION ADULT. - ENERGY INTAKE
airway patent/breath sounds equal/good air movement/respirations non-labored/clear to auscultation bilaterally Good (>75%) airway patent/breath sounds equal/good air movement/respirations non-labored/clear to auscultation bilaterally/no rales/no rhonchi/no wheezes

## 2021-10-11 NOTE — BH CONSULTATION LIAISON PROGRESS NOTE - CASE SUMMARY
Met with the patient via tele platform, impression and plan discussed with acp Keara Baldwin NP and agreed upon. Patient denies any si when asked, but there is significant concerns raised by sister about patient's ongoing depression and SI even when sober. Sister is very concerned about the patient's safety at this time, and is advocating for an inpatient psychiatric admission.   Continue 1:1co for now. Continue w/d treatment. Will see on Monday for f/u and risk assessment. CANNOT LEAVE AMA
Pt seen with trainee. Pt presents as AA Ox 3 but highly superficial, minimizing psychiatric concerns, asking that family be called in front of her, etc. See above. Given previous collateral and current failure to reverse those concerns (despite numerous attempts as documented above), we suggest proceeding with inpatient psych admission on involuntary basis (2PC done) when medically cleared. Continue 1:1 until then. Cannot leave AMA. Will continue to follow while here but can go to psych whenever bed available. 
met with the patient. denies any si or hi when asked. Seemed perplexed when told that sister has concerns for safety. stated that concerns were only because ' I drink alcohol. They want me to stop drinking'. Denies any mood symptoms, denies any si or hi, denies any a/vh or paranoia.  Will clarify collateral and safety with family again tomorrow.

## 2021-10-11 NOTE — BH CONSULTATION LIAISON PROGRESS NOTE - NSBHFUPINTERVALHXFT_PSY_A_CORE
Chart reviewed, no acute events overnight.    During interview, pt continues to minimize symptoms and history surrounding current admission. Patient denies having any psychiatric issues including depression and problems with substances. Patient continues to deny recent severe depression with SI (as obtained via collateral from Shirley by medical/psychiatric team). Patient denies needing inpatient psychiatric admission and asks to go home.     Patient is pleasant and engaged during interview, but appears superficial in the content on her interview.     Of note, writer attempted to obtain additional collateral from Shirley and pts sister Darius, but writer could not reach these persons.    As such, key objective data surrounding safety risk of patient is multiple rounds of collateral obtained from Shirley (patient's partner), who reported that she feels pt is imminent of harm to self if discharged in setting of patient with recent suicidal gestures (drinking perfume) and expressing serious SI.

## 2021-10-11 NOTE — BH CONSULTATION LIAISON PROGRESS NOTE - CURRENT MEDICATION
MEDICATIONS  (STANDING):  albuterol/ipratropium for Nebulization 3 milliLiter(s) Nebulizer every 6 hours  benzonatate 100 milliGRAM(s) Oral every 8 hours  enoxaparin Injectable 40 milliGRAM(s) SubCutaneous daily  folic acid 1 milliGRAM(s) Oral daily  influenza   Vaccine 0.5 milliLiter(s) IntraMuscular once  LORazepam   Injectable   IV Push   LORazepam   Injectable 0.5 milliGRAM(s) IV Push every 12 hours  multivitamin 1 Tablet(s) Oral daily  nicotine -  14 mG/24Hr(s) Patch 1 patch Transdermal daily  senna 2 Tablet(s) Oral at bedtime  thiamine IVPB 500 milliGRAM(s) IV Intermittent three times a day    MEDICATIONS  (PRN):  acetaminophen   Tablet .. 650 milliGRAM(s) Oral every 6 hours PRN Temp greater or equal to 38C (100.4F), Mild Pain (1 - 3)  aluminum hydroxide/magnesium hydroxide/simethicone Suspension 30 milliLiter(s) Oral every 4 hours PRN Dyspepsia  guaiFENesin Oral Liquid (Sugar-Free) 100 milliGRAM(s) Oral every 6 hours PRN Cough  LORazepam     Tablet 2 milliGRAM(s) Oral every 2 hours PRN Symptom-triggered 2 point increase in CIWA-Ar  LORazepam   Injectable 2 milliGRAM(s) IV Push every 1 hour PRN Symptom-triggered: each CIWA -Ar score 8 or GREATER  melatonin 3 milliGRAM(s) Oral at bedtime PRN Insomnia  nicotine  Polacrilex Gum 2 milliGRAM(s) Oral four times a day PRN cravings  ondansetron Injectable 4 milliGRAM(s) IV Push every 8 hours PRN Nausea and/or Vomiting  
MEDICATIONS  (STANDING):  albuterol/ipratropium for Nebulization 3 milliLiter(s) Nebulizer every 6 hours  benzonatate 100 milliGRAM(s) Oral every 8 hours  enoxaparin Injectable 40 milliGRAM(s) SubCutaneous daily  folic acid 1 milliGRAM(s) Oral daily  influenza   Vaccine 0.5 milliLiter(s) IntraMuscular once  LORazepam   Injectable   IV Push   LORazepam   Injectable 0.5 milliGRAM(s) IV Push every 12 hours  multivitamin 1 Tablet(s) Oral daily  nicotine -  14 mG/24Hr(s) Patch 1 patch Transdermal daily  thiamine IVPB 500 milliGRAM(s) IV Intermittent three times a day    MEDICATIONS  (PRN):  acetaminophen   Tablet .. 650 milliGRAM(s) Oral every 6 hours PRN Temp greater or equal to 38C (100.4F), Mild Pain (1 - 3)  aluminum hydroxide/magnesium hydroxide/simethicone Suspension 30 milliLiter(s) Oral every 4 hours PRN Dyspepsia  guaiFENesin Oral Liquid (Sugar-Free) 100 milliGRAM(s) Oral every 6 hours PRN Cough  LORazepam     Tablet 2 milliGRAM(s) Oral every 2 hours PRN Symptom-triggered 2 point increase in CIWA-Ar  LORazepam   Injectable 2 milliGRAM(s) IV Push every 1 hour PRN Symptom-triggered: each CIWA -Ar score 8 or GREATER  melatonin 3 milliGRAM(s) Oral at bedtime PRN Insomnia  ondansetron Injectable 4 milliGRAM(s) IV Push every 8 hours PRN Nausea and/or Vomiting  
MEDICATIONS  (STANDING):  albuterol/ipratropium for Nebulization 3 milliLiter(s) Nebulizer every 6 hours  benzonatate 100 milliGRAM(s) Oral every 8 hours  enoxaparin Injectable 40 milliGRAM(s) SubCutaneous daily  folic acid 1 milliGRAM(s) Oral daily  influenza   Vaccine 0.5 milliLiter(s) IntraMuscular once  LORazepam   Injectable 1 milliGRAM(s) IV Push every 4 hours  LORazepam   Injectable   IV Push   multivitamin 1 Tablet(s) Oral daily  nicotine -  14 mG/24Hr(s) Patch 1 patch Transdermal daily  thiamine IVPB 500 milliGRAM(s) IV Intermittent three times a day    MEDICATIONS  (PRN):  acetaminophen   Tablet .. 650 milliGRAM(s) Oral every 6 hours PRN Temp greater or equal to 38C (100.4F), Mild Pain (1 - 3)  aluminum hydroxide/magnesium hydroxide/simethicone Suspension 30 milliLiter(s) Oral every 4 hours PRN Dyspepsia  LORazepam     Tablet 2 milliGRAM(s) Oral every 2 hours PRN Symptom-triggered 2 point increase in CIWA-Ar  LORazepam   Injectable 2 milliGRAM(s) IV Push every 1 hour PRN Symptom-triggered: each CIWA -Ar score 8 or GREATER  melatonin 3 milliGRAM(s) Oral at bedtime PRN Insomnia  ondansetron Injectable 4 milliGRAM(s) IV Push every 8 hours PRN Nausea and/or Vomiting  
MEDICATIONS  (STANDING):  albuterol/ipratropium for Nebulization 3 milliLiter(s) Nebulizer every 6 hours  benzonatate 100 milliGRAM(s) Oral every 8 hours  enoxaparin Injectable 40 milliGRAM(s) SubCutaneous daily  folic acid 1 milliGRAM(s) Oral daily  influenza   Vaccine 0.5 milliLiter(s) IntraMuscular once  LORazepam   Injectable 0.5 milliGRAM(s) IV Push every 4 hours  LORazepam   Injectable   IV Push   multivitamin 1 Tablet(s) Oral daily  nicotine -  14 mG/24Hr(s) Patch 1 patch Transdermal daily  thiamine IVPB 500 milliGRAM(s) IV Intermittent three times a day    MEDICATIONS  (PRN):  acetaminophen   Tablet .. 650 milliGRAM(s) Oral every 6 hours PRN Temp greater or equal to 38C (100.4F), Mild Pain (1 - 3)  aluminum hydroxide/magnesium hydroxide/simethicone Suspension 30 milliLiter(s) Oral every 4 hours PRN Dyspepsia  guaiFENesin Oral Liquid (Sugar-Free) 100 milliGRAM(s) Oral every 6 hours PRN Cough  LORazepam     Tablet 2 milliGRAM(s) Oral every 2 hours PRN Symptom-triggered 2 point increase in CIWA-Ar  LORazepam   Injectable 2 milliGRAM(s) IV Push every 1 hour PRN Symptom-triggered: each CIWA -Ar score 8 or GREATER  melatonin 3 milliGRAM(s) Oral at bedtime PRN Insomnia  ondansetron Injectable 4 milliGRAM(s) IV Push every 8 hours PRN Nausea and/or Vomiting  
MEDICATIONS  (STANDING):  albuterol/ipratropium for Nebulization 3 milliLiter(s) Nebulizer every 6 hours  benzonatate 100 milliGRAM(s) Oral every 8 hours  enoxaparin Injectable 40 milliGRAM(s) SubCutaneous daily  folic acid 1 milliGRAM(s) Oral daily  influenza   Vaccine 0.5 milliLiter(s) IntraMuscular once  multivitamin 1 Tablet(s) Oral daily  nicotine -  14 mG/24Hr(s) Patch 1 patch Transdermal daily  senna 2 Tablet(s) Oral at bedtime  thiamine 100 milliGRAM(s) Oral daily  traZODone 50 milliGRAM(s) Oral at bedtime    MEDICATIONS  (PRN):  acetaminophen   Tablet .. 650 milliGRAM(s) Oral every 6 hours PRN Temp greater or equal to 38C (100.4F), Mild Pain (1 - 3)  aluminum hydroxide/magnesium hydroxide/simethicone Suspension 30 milliLiter(s) Oral every 4 hours PRN Dyspepsia  guaiFENesin Oral Liquid (Sugar-Free) 100 milliGRAM(s) Oral every 6 hours PRN Cough  LORazepam     Tablet 2 milliGRAM(s) Oral every 2 hours PRN Symptom-triggered 2 point increase in CIWA-Ar  LORazepam   Injectable 2 milliGRAM(s) IV Push every 1 hour PRN Symptom-triggered: each CIWA -Ar score 8 or GREATER  melatonin 3 milliGRAM(s) Oral at bedtime PRN Insomnia  nicotine  Polacrilex Gum 2 milliGRAM(s) Oral four times a day PRN cravings  ondansetron Injectable 4 milliGRAM(s) IV Push every 8 hours PRN Nausea and/or Vomiting  
MEDICATIONS  (STANDING):  albuterol/ipratropium for Nebulization 3 milliLiter(s) Nebulizer every 6 hours  benzonatate 100 milliGRAM(s) Oral every 8 hours  enoxaparin Injectable 40 milliGRAM(s) SubCutaneous daily  folic acid 1 milliGRAM(s) Oral daily  influenza   Vaccine 0.5 milliLiter(s) IntraMuscular once  LORazepam   Injectable   IV Push   LORazepam   Injectable 0.5 milliGRAM(s) IV Push every 12 hours  multivitamin 1 Tablet(s) Oral daily  nicotine -  14 mG/24Hr(s) Patch 1 patch Transdermal daily  senna 2 Tablet(s) Oral at bedtime  thiamine IVPB 500 milliGRAM(s) IV Intermittent three times a day    MEDICATIONS  (PRN):  acetaminophen   Tablet .. 650 milliGRAM(s) Oral every 6 hours PRN Temp greater or equal to 38C (100.4F), Mild Pain (1 - 3)  aluminum hydroxide/magnesium hydroxide/simethicone Suspension 30 milliLiter(s) Oral every 4 hours PRN Dyspepsia  guaiFENesin Oral Liquid (Sugar-Free) 100 milliGRAM(s) Oral every 6 hours PRN Cough  LORazepam     Tablet 2 milliGRAM(s) Oral every 2 hours PRN Symptom-triggered 2 point increase in CIWA-Ar  LORazepam   Injectable 2 milliGRAM(s) IV Push every 1 hour PRN Symptom-triggered: each CIWA -Ar score 8 or GREATER  melatonin 3 milliGRAM(s) Oral at bedtime PRN Insomnia  nicotine  Polacrilex Gum 2 milliGRAM(s) Oral four times a day PRN cravings  ondansetron Injectable 4 milliGRAM(s) IV Push every 8 hours PRN Nausea and/or Vomiting  
MEDICATIONS  (STANDING):  benzonatate 100 milliGRAM(s) Oral every 8 hours  enoxaparin Injectable 40 milliGRAM(s) SubCutaneous daily  folic acid 1 milliGRAM(s) Oral daily  influenza   Vaccine 0.5 milliLiter(s) IntraMuscular once  multivitamin 1 Tablet(s) Oral daily  nicotine -  14 mG/24Hr(s) Patch 1 patch Transdermal daily  senna 2 Tablet(s) Oral at bedtime  thiamine 100 milliGRAM(s) Oral daily  traZODone 50 milliGRAM(s) Oral at bedtime    MEDICATIONS  (PRN):  acetaminophen   Tablet .. 650 milliGRAM(s) Oral every 6 hours PRN Temp greater or equal to 38C (100.4F), Mild Pain (1 - 3)  guaiFENesin Oral Liquid (Sugar-Free) 100 milliGRAM(s) Oral every 6 hours PRN Cough  melatonin 3 milliGRAM(s) Oral at bedtime PRN Insomnia  nicotine  Polacrilex Gum 2 milliGRAM(s) Oral four times a day PRN cravings

## 2021-10-11 NOTE — DIETITIAN INITIAL EVALUATION ADULT. - CHIEF COMPLAINT
The patient is a 60 yr old female presenting with alcohol intoxication and now for concern for withdrawal. Cleared for dc to [facility], however, no bed available.

## 2021-10-11 NOTE — BH CONSULTATION LIAISON PROGRESS NOTE - NSBHFUPREASONCONS_PSY_A_CORE
suicidality
suicidality/alcohol
depression/med management
depression/med management
suicidality/alcohol
med management
suicidality

## 2021-10-12 ENCOUNTER — TRANSCRIPTION ENCOUNTER (OUTPATIENT)
Age: 61
End: 2021-10-12

## 2021-10-12 VITALS
HEART RATE: 85 BPM | RESPIRATION RATE: 17 BRPM | SYSTOLIC BLOOD PRESSURE: 109 MMHG | OXYGEN SATURATION: 98 % | TEMPERATURE: 99 F | DIASTOLIC BLOOD PRESSURE: 57 MMHG

## 2021-10-12 PROCEDURE — 99239 HOSP IP/OBS DSCHRG MGMT >30: CPT

## 2021-10-12 RX ORDER — CLONAZEPAM 1 MG
1 TABLET ORAL
Qty: 0 | Refills: 0 | DISCHARGE

## 2021-10-12 RX ORDER — FOLIC ACID 0.8 MG
1 TABLET ORAL
Qty: 30 | Refills: 0
Start: 2021-10-12 | End: 2021-11-10

## 2021-10-12 RX ORDER — AMLODIPINE BESYLATE 2.5 MG/1
1 TABLET ORAL
Qty: 0 | Refills: 0 | DISCHARGE

## 2021-10-12 RX ORDER — TRAZODONE HCL 50 MG
1 TABLET ORAL
Qty: 15 | Refills: 0
Start: 2021-10-12 | End: 2021-10-26

## 2021-10-12 RX ORDER — LOSARTAN POTASSIUM 100 MG/1
1 TABLET, FILM COATED ORAL
Qty: 0 | Refills: 0 | DISCHARGE

## 2021-10-12 RX ORDER — NICOTINE POLACRILEX 2 MG
1 GUM BUCCAL
Qty: 30 | Refills: 0
Start: 2021-10-12 | End: 2021-11-10

## 2021-10-12 RX ORDER — THIAMINE MONONITRATE (VIT B1) 100 MG
1 TABLET ORAL
Qty: 30 | Refills: 0
Start: 2021-10-12 | End: 2021-11-10

## 2021-10-12 RX ADMIN — Medication 100 MILLIGRAM(S): at 13:37

## 2021-10-12 RX ADMIN — Medication 1 TABLET(S): at 13:37

## 2021-10-12 RX ADMIN — Medication 1 MILLIGRAM(S): at 13:37

## 2021-10-12 RX ADMIN — Medication 1 PATCH: at 08:10

## 2021-10-12 RX ADMIN — ENOXAPARIN SODIUM 40 MILLIGRAM(S): 100 INJECTION SUBCUTANEOUS at 13:31

## 2021-10-12 RX ADMIN — Medication 1 PATCH: at 13:37

## 2021-10-12 RX ADMIN — Medication 1 PATCH: at 13:28

## 2021-10-12 NOTE — PROGRESS NOTE ADULT - REASON FOR ADMISSION
intoxication

## 2021-10-12 NOTE — PROGRESS NOTE ADULT - PROBLEM SELECTOR PLAN 3
Pt likely with underlying emphysema/COPD given longstanding tobacco use  CXR with clear lungs  On Duonebs q6hrs   s/p  Lasix 20mg IV x1 as pt was on standing fluids  clinically improving -satting 93-97% on RA Pt likely with underlying emphysema/COPD given longstanding tobacco use  CXR with clear lungs  s/p  Lasix 20mg IV x1 as pt was on standing fluids  clinically improving -satting 98% on RA

## 2021-10-12 NOTE — PROGRESS NOTE ADULT - ASSESSMENT
60 yr old female presenting with alcohol intoxication and now for concern for withdrawal 

## 2021-10-12 NOTE — PROGRESS NOTE ADULT - PROBLEM SELECTOR PROBLEM 1
Alcohol intoxication

## 2021-10-12 NOTE — DISCHARGE NOTE NURSING/CASE MANAGEMENT/SOCIAL WORK - PATIENT PORTAL LINK FT
You can access the FollowMyHealth Patient Portal offered by [facility] by registering at the following website: [email or web address]. By joining [employer]’s FollowMyHealth portal, you will also be able to view your health information using other applications (apps) compatible with our system.

## 2021-10-12 NOTE — PROGRESS NOTE ADULT - PROBLEM SELECTOR PROBLEM 5
Transaminitis
Transaminitis
Anxiety
Transaminitis

## 2021-10-12 NOTE — PROGRESS NOTE ADULT - PROBLEM SELECTOR PROBLEM 3
Reactive airway disease with wheezing
Leukocytosis
Reactive airway disease with wheezing

## 2021-10-12 NOTE — PROGRESS NOTE ADULT - PROBLEM SELECTOR PLAN 2
Patient endorsed "some audiovisual hallucination in the past, with bright lights and people crowded around her and speaking in voices that she does not understand."  Currently not endorsing hallucinations however not engaging much in conversation  DW Psych -  recommend inpatient psych after medically cleared Patient endorsed "some audiovisual hallucination in the past, with bright lights and people crowded around her and speaking in voices that she does not understand."  Currently not endorsing hallucinations however not engaging much in conversation  DW Psych  Attending Dr West-  No need for inpt pysch, psychiatrically cleared for discharge when medically cleared; can go home with outpatient services which she can call to arrange: [facility] [phone]

## 2021-10-12 NOTE — PROGRESS NOTE ADULT - SUBJECTIVE AND OBJECTIVE BOX
Wade Marshall  Hospitalist  Pager- [phone]    Patient is a 60y old  Female who presents with a chief complaint of intoxication (09 Oct 2021 07:30)      SUBJECTIVE / OVERNIGHT EVENTS: patient seen and examined by bedside.  Cough improved  Denies headache, dizziness, SOB, CP, Palpitations , N/V/D, abdominal pain    MEDICATIONS  (STANDING):  benzonatate 100 milliGRAM(s) Oral every 8 hours  enoxaparin Injectable 40 milliGRAM(s) SubCutaneous daily  folic acid 1 milliGRAM(s) Oral daily  influenza   Vaccine 0.5 milliLiter(s) IntraMuscular once  multivitamin 1 Tablet(s) Oral daily  nicotine -  14 mG/24Hr(s) Patch 1 patch Transdermal daily  senna 2 Tablet(s) Oral at bedtime  thiamine 100 milliGRAM(s) Oral daily  traZODone 50 milliGRAM(s) Oral at bedtime    MEDICATIONS  (PRN):  acetaminophen   Tablet .. 650 milliGRAM(s) Oral every 6 hours PRN Temp greater or equal to 38C (100.4F), Mild Pain (1 - 3)  guaiFENesin Oral Liquid (Sugar-Free) 100 milliGRAM(s) Oral every 6 hours PRN Cough  melatonin 3 milliGRAM(s) Oral at bedtime PRN Insomnia  nicotine  Polacrilex Gum 2 milliGRAM(s) Oral four times a day PRN cravings      Vital Signs Last 24 Hrs  T(C): 37.2 (12 Oct 2021 05:51), Max: 37.3 (11 Oct 2021 20:43)  T(F): 99 (12 Oct 2021 05:51), Max: 99.2 (11 Oct 2021 20:43)  HR: 74 (12 Oct 2021 05:51) (74 - 95)  BP: 100/57 (12 Oct 2021 05:51) (100/57 - 127/74)  BP(mean): --  RR: 18 (12 Oct 2021 05:51) (17 - 18)  SpO2: 98% (12 Oct 2021 05:51) (97% - 99%)  CAPILLARY BLOOD GLUCOSE    I&O's Summary        PHYSICAL EXAM:  CONSTITUTIONAL: NAD, well-developed,   EYES: PERRLA; conjunctiva and sclera clear  RESPIRATORY: B/L AE, no wheezing   CARDIOVASCULAR: Regular rate and rhythm, normal S1 and S2, no murmur/rub/gallop; No lower extremity edema  ABDOMEN: Nontender to palpation, normoactive bowel sounds, no rebound/guarding  MUSCULOSKELETAL:  No clubbing or cyanosis of digits; no joint swelling or tenderness to palpation  PSYCH: A+O to person, place, and time; affect appropriate  NEUROLOGY: CN 2-12 are intact and symmetric; no gross sensory deficits; +tremors with arms extended,   SKIN: No rashes; no palpable lesions      LABS:                    RADIOLOGY & ADDITIONAL TESTS:    Imaging Personally Reviewed:    Consultant(s) Notes Reviewed:      Care Discussed with Consultants/Other Providers:   Wade Marshall  Hospitalist  Pager- [phone]    Patient is a 60y old  Female who presents with a chief complaint of intoxication (09 Oct 2021 07:30)      SUBJECTIVE / OVERNIGHT EVENTS: patient seen and examined by bedside.   Denies headache, dizziness, SOB, CP, Palpitations , N/V/D, abdominal pain       MEDICATIONS  (STANDING):  benzonatate 100 milliGRAM(s) Oral every 8 hours  enoxaparin Injectable 40 milliGRAM(s) SubCutaneous daily  folic acid 1 milliGRAM(s) Oral daily  influenza   Vaccine 0.5 milliLiter(s) IntraMuscular once  multivitamin 1 Tablet(s) Oral daily  nicotine -  14 mG/24Hr(s) Patch 1 patch Transdermal daily  senna 2 Tablet(s) Oral at bedtime  thiamine 100 milliGRAM(s) Oral daily  traZODone 50 milliGRAM(s) Oral at bedtime    MEDICATIONS  (PRN):  acetaminophen   Tablet .. 650 milliGRAM(s) Oral every 6 hours PRN Temp greater or equal to 38C (100.4F), Mild Pain (1 - 3)  guaiFENesin Oral Liquid (Sugar-Free) 100 milliGRAM(s) Oral every 6 hours PRN Cough  melatonin 3 milliGRAM(s) Oral at bedtime PRN Insomnia  nicotine  Polacrilex Gum 2 milliGRAM(s) Oral four times a day PRN cravings      Vital Signs Last 24 Hrs  T(C): 37.2 (12 Oct 2021 05:51), Max: 37.3 (11 Oct 2021 20:43)  T(F): 99 (12 Oct 2021 05:51), Max: 99.2 (11 Oct 2021 20:43)  HR: 74 (12 Oct 2021 05:51) (74 - 95)  BP: 100/57 (12 Oct 2021 05:51) (100/57 - 127/74)  BP(mean): --  RR: 18 (12 Oct 2021 05:51) (17 - 18)  SpO2: 98% (12 Oct 2021 05:51) (97% - 99%)  CAPILLARY BLOOD GLUCOSE    I&O's Summary        PHYSICAL EXAM:  CONSTITUTIONAL: NAD, well-developed,   EYES: PERRLA; conjunctiva and sclera clear  RESPIRATORY: B/L AE, no wheezing   CARDIOVASCULAR: Regular rate and rhythm, normal S1 and S2, no murmur/rub/gallop; No lower extremity edema  ABDOMEN: Nontender to palpation, normoactive bowel sounds, no rebound/guarding  MUSCULOSKELETAL:  No clubbing or cyanosis of digits; no joint swelling or tenderness to palpation  PSYCH: A+O to person, place, and time; affect appropriate  NEUROLOGY: CN 2-12 are intact and symmetric; no gross sensory deficits; +tremors with arms extended,   SKIN: No rashes; no palpable lesions      LABS:                    RADIOLOGY & ADDITIONAL TESTS:    Imaging Personally Reviewed:    Consultant(s) Notes Reviewed:      Care Discussed with Consultants/Other Providers:

## 2021-10-12 NOTE — PROGRESS NOTE ADULT - PROBLEM SELECTOR PROBLEM 4
Transaminitis
Leukocytosis

## 2021-10-12 NOTE — CHART NOTE - NSCHARTNOTEFT_GEN_A_CORE
Collateral obtained from Shirley (pt's life partner, lives with her) this AM. She apologized for not being able to speak yesterday. She stated that she has seen a dramatic improvement in pt's state in past week after sobering/detoxing. She denies any safety concerns at the moment and understands her recent SI as 2/2 alcohol intoxication and agrees that she would benefit the most from outpatient drug rehabilitation services and return to home today/when possible. This was discussed with primary team who agrees with plan. Will cancel inpatient psych admission.     Plan:   - D/C sitter  - Psychiatrically cleared for discharge when medically cleared; can go home with outpatient services which she can call to arrange: [facility] [phone]  - Psychiatry to sign off. Please call if questions/concerns arise.     Javi Arzate MD   Consultation Psychiatry, Director  [phone]

## 2021-10-12 NOTE — PROGRESS NOTE ADULT - PROBLEM SELECTOR PLAN 6
Chronic unstable  Called pharmacy to confirm: klonopin 0.5 mg TID prescribed by Dr. Kaur Pardo   Fills meds [facility] @ [phone]
Chronic unstable  Called pharmacy to confirm: klonopin 0.5 mg TID prescribed by Dr. Kaur Pardo   Fills meds [facility] @ [phone]
Chronic unstable  Called pharmacy to confirm: klonopin 0.5 mg TID prescribed by Dr. Kaur Pardo   Fills meds [facility] @ [phone]
Chronic unstable  Called pharmacy to confirm: klonopin 0.5 mg TID prescribed by Dr. Kaur Pardo   Fills meds [facility] @ [phone]
Chronic unstable  Called pharmacy to confirm: klonopin 0.5 mg TID prescribed by Dr. Kaur Pardo   Fills meds [facility] @ [phone]
Chronic unstable  Called pharmacy to confirm: klonopin 0.5 mg TID prescribed by Dr. Kaur Pardo   Fills meds [facility] @ [phone]
Chronic unstable  Called pharmacy to confirm: klonopin 0.5 mg TID prescribed by Dr. Kaur Pardo   Fills meds [facility] @ [phone]
Chronic unstable  Called pharmacy to confirm: klonopin 0.5 mg TID prescribed by Dr. Kaur Pardo   Fills meds [facility] @ [phone]
DVT ppx: lovenox   Care discussed with sister Shirley
Chronic unstable  Called pharmacy to confirm: klonopin 0.5 mg TID prescribed by Dr. Kaur Pardo   Fills meds [facility] @ [phone]
Chronic unstable  Called pharmacy to confirm: klonopin 0.5 mg TID prescribed by Dr. Kaur Pardo   Fills meds [facility] @ [phone]

## 2021-10-12 NOTE — PROGRESS NOTE ADULT - PROBLEM SELECTOR PLAN 7
DVT ppx: lovenox  dispo : pt medically cleared , plan for inpatient Psych admission at [facility], awaiting bed   plan of care psych, recommend inpatient psych as sister very concerned, pt danger to self,  2PC completed by Psych  case d/w  Psych SW, dc to [facility], pending bed availability  Dc planning 33mins DVT ppx: lovenox  Dispo : [facility]  Attending Dr West-  No need for inpt pysch, psychiatrically cleared for discharge when medically cleared; can go home with outpatient services which she can call to arrange: [facility] [phone]  DE planning 33mins DVT ppx: lovenox  Dispo : [facility]  Attending Dr West-  No need for inpt pysch, psychiatrically cleared for discharge when medically cleared; can go home with outpatient services which she can call to arrange: [facility] [phone]  updated pts contact- Shirley Roe planning 33mins

## 2021-10-12 NOTE — PROGRESS NOTE ADULT - PROBLEM SELECTOR PROBLEM 2
Psychosis

## 2022-09-12 NOTE — PROGRESS NOTE ADULT - PROBLEM SELECTOR PROBLEM 6
Anxiety
Prophylactic measure
Anxiety
No

## 2023-07-24 NOTE — DIETITIAN INITIAL EVALUATION ADULT. - ENTER FROM (G/KG)
Consume 3 meals a day, 4-5 hours apart. Try not to skip any meals. Aim for 30-45 grams of carbohydrates per meal and 15 grams of carbohydrates at post dinner snack. Include whole grains, non starchy vegetables and 2 serving of fruits in a day. Drink plenty of water.
0.8